# Patient Record
Sex: MALE | Race: WHITE | NOT HISPANIC OR LATINO | Employment: OTHER | URBAN - METROPOLITAN AREA
[De-identification: names, ages, dates, MRNs, and addresses within clinical notes are randomized per-mention and may not be internally consistent; named-entity substitution may affect disease eponyms.]

---

## 2018-05-15 ENCOUNTER — OFFICE VISIT (OUTPATIENT)
Dept: ORTHOPEDIC SURGERY | Facility: CLINIC | Age: 58
End: 2018-05-15

## 2018-05-15 VITALS
HEIGHT: 72 IN | WEIGHT: 210.54 LBS | HEART RATE: 79 BPM | SYSTOLIC BLOOD PRESSURE: 140 MMHG | BODY MASS INDEX: 28.52 KG/M2 | DIASTOLIC BLOOD PRESSURE: 87 MMHG

## 2018-05-15 DIAGNOSIS — M17.12 PRIMARY OSTEOARTHRITIS OF LEFT KNEE: Primary | ICD-10-CM

## 2018-05-15 DIAGNOSIS — M25.562 LEFT KNEE PAIN, UNSPECIFIED CHRONICITY: ICD-10-CM

## 2018-05-15 PROCEDURE — 99214 OFFICE O/P EST MOD 30 MIN: CPT | Performed by: ORTHOPAEDIC SURGERY

## 2018-05-15 PROCEDURE — 20610 DRAIN/INJ JOINT/BURSA W/O US: CPT | Performed by: ORTHOPAEDIC SURGERY

## 2018-05-15 RX ORDER — ROSUVASTATIN CALCIUM 10 MG/1
10 TABLET, COATED ORAL NIGHTLY
COMMUNITY
Start: 2018-04-30

## 2018-05-15 RX ORDER — METOPROLOL SUCCINATE 50 MG/1
50 TABLET, EXTENDED RELEASE ORAL DAILY
COMMUNITY
Start: 2018-03-30

## 2018-05-15 RX ORDER — CYCLOBENZAPRINE HCL 5 MG
TABLET ORAL
COMMUNITY
Start: 2018-04-30 | End: 2022-06-16

## 2018-05-15 RX ORDER — LIDOCAINE HYDROCHLORIDE 10 MG/ML
3 INJECTION, SOLUTION INFILTRATION; PERINEURAL
Status: COMPLETED | OUTPATIENT
Start: 2018-05-15 | End: 2018-05-15

## 2018-05-15 RX ORDER — TRIAMCINOLONE ACETONIDE 40 MG/ML
40 INJECTION, SUSPENSION INTRA-ARTICULAR; INTRAMUSCULAR
Status: COMPLETED | OUTPATIENT
Start: 2018-05-15 | End: 2018-05-15

## 2018-05-15 RX ORDER — HYDROXYCHLOROQUINE SULFATE 200 MG/1
200 TABLET, FILM COATED ORAL 2 TIMES DAILY
Refills: 5 | COMMUNITY
Start: 2018-02-22 | End: 2022-06-16

## 2018-05-15 RX ADMIN — TRIAMCINOLONE ACETONIDE 40 MG: 40 INJECTION, SUSPENSION INTRA-ARTICULAR; INTRAMUSCULAR at 08:54

## 2018-05-15 RX ADMIN — LIDOCAINE HYDROCHLORIDE 3 ML: 10 INJECTION, SOLUTION INFILTRATION; PERINEURAL at 08:54

## 2018-05-15 NOTE — PROGRESS NOTES
Procedure   Large Joint Arthrocentesis  Date/Time: 5/15/2018 8:54 AM  Consent given by: patient  Site marked: site marked  Timeout: Immediately prior to procedure a time out was called to verify the correct patient, procedure, equipment, support staff and site/side marked as required   Supporting Documentation  Indications: pain   Procedure Details  Location: knee - L knee  Preparation: Patient was prepped and draped in the usual sterile fashion  Needle size: 25 G  Approach: anterolateral  Medications administered: 3 mL lidocaine 1 %; 40 mg triamcinolone acetonide 40 MG/ML  Patient tolerance: patient tolerated the procedure well with no immediate complications

## 2018-05-15 NOTE — PROGRESS NOTES
[facility] [place] [facility]opa[facility] Surgery Clinic Note    Subjective     Pain of the Left Knee ((L) Knee Arthroscopy with Partial Medial Meniscectomy/Excision of Medial [place] 11/29/16. Progressively gotten worse last 4mos. 9/10 pain. Tried ice, elevation, and OTC Aleve. Sitting & walking for long periods causes pain and throbbing. )      DARI Mcadams is a 58 y.o. male. Patient is well-known to me.  Last seen in 2017.  Patient has not done well after arthroscopic knee surgery and a course of viscosupplementation.  He is an avid golfer and travels a lot for work.  He is having difficulty doing both of those things.  He is used to knee braces without a lot of improvement.  He has difficulty both at rest and with activity.  He has pain on the medial side of the knee and in the back of the knee.  He is seeing a dermatologist for a skin condition that has yet to see rheumatology.  Taken four and a half months to try to get into a rheumatologist in the past.     History reviewed. No pertinent past medical history.   Past Surgical History:   Procedure Laterality Date   • ELBOW ARTHROSCOPY     • KNEE SURGERY  11/21/2016    Arthroscopy       Family History   Problem Relation Age of Onset   • Stroke Mother    • Hypertension Mother    • Heart attack Mother    • Cancer Father    • Heart attack Father      Social History     Social History   • Marital status:      Spouse name: N/A   • Number of children: N/A   • Years of education: N/A     Occupational History   • Not on file.     Social History Main Topics   • Smoking status: Former Smoker     Packs/day: 1.00     Quit date: 5/15/2013   • Smokeless tobacco: Never Used   • Alcohol use No   • Drug use: No   • Sexual activity: Defer     Other Topics Concern   • Not on file     Social History Narrative   • No narrative on file      No current outpatient prescriptions on file prior to visit.     No current facility-administered medications on file prior to visit.       Allergies  "  Allergen Reactions   • Acetaminophen Other (See Comments)     Do not know    • Wellbutrin [Bupropion] Rash        The following portions of the patient's history were reviewed and updated as appropriate: allergies, current medications, past family history, past medical history, past social history, past surgical history and problem list.    Review of Systems   Constitutional: Positive for activity change.   HENT: Negative.    Eyes: Negative.    Respiratory: Negative.    Cardiovascular: Negative.    Gastrointestinal: Negative.    Endocrine: Negative.    Genitourinary: Negative.    Musculoskeletal: Positive for joint swelling.        Joint Pain    Skin: Positive for rash.   Allergic/Immunologic: Positive for immunocompromised state.   Neurological: Negative.    Hematological: Negative.    Psychiatric/Behavioral: Negative.         Objective      Physical Exam  /87   Pulse 79   Ht 183 cm (72.05\")   Wt 95.5 kg (210 lb 8.6 oz)   BMI 28.52 kg/m²     Body mass index is 28.52 kg/m².    General  Mental Status - alert  General Appearance - cooperative, well groomed, not in acute distress  Orientation - Oriented X3  Build & Nutrition - well developed and well nourished  Posture - normal posture  Gait - normal gait     Integumentary  Global Assessment  Examination of related systems reveals - no lymphadenopathy  General Characteristics  Overall examination of the patient's skin reveals - no rashes, no evidence of scars, no suspicious lesions and no bruises.  Color - normal coloration of skin.    Ortho Exam  Peripheral Vascular:    Upper Extremity:   Inspection:  Left--no cyanotic nail beds Right--no cyanotic nail beds   Bilateral:  Pink nail beds with brisk capillary refill   Palpation:  Bilateral radial pulse normal    Musculoskeletal:  Global Assessment:  Overall assessment of Lower Extremity Muscle Strength and Tone:  Left quadriceps--5/5   Left hamstrings--5/5       Left tibialis anterior--5/5  Left " gastroc-soleus--5/5  Left EHL --5/5    Lower Extremity:  Knee/Patella:  No digital clubbing or cyanosis.    Examination of left knee reveals:  Normal deep tendon reflexes, coordination, strength, tone, sensation.  No known fractures or deformities.    Inspection and Palpation:  Left knee:  Tenderness:  Over the medial joint line and moderate severity  Effusion:  1+  Crepitus:  Positive  Pulses:  2+  Ecchymosis:  None  Warmth:  None     ROM:  Right:  Extension: 5    Flexion: 120   Left:  Extension: 5     Flexion:120    Instability:    Left:  Lachman Test:  Negative, Varus stress test negative, Valgus stress test negative    Deformities/Malalignments/Discrepancies:    Left:  Genu Varum   Right:  No deformity    Functional Testing:  Judith's test:  Negative  Patella grind test:  Positive  Q-angle:  normal    Imaging/Studies  Imaging Results (last 24 hours)     Procedure Component Value Units Date/Time    XR Knee 3+ View With [place] Left [[id number]] Resulted:  05/15/18 0841     Updated:  05/15/18 0843    Narrative:       Knee X-Ray    Indication: Pain    Study:  Upright AP, Lateral, and Sunrise views of Left knee(s)    Comparison: None    Findings:    Patient appears to have moderate to severe degenerative changes in the   medial compartment.  There are moderate degenerative changes in the   lateral compartment.  There are moderate changes in the patellofemoral   compartment.  Patient has overall varus alignment.      Impression: Moderate to severe medial compartment and moderate lateral   patellofemoral compartment osteoarthritis of the left knee.              Assessment:  1. Primary osteoarthritis of left knee    2. Left knee pain, unspecified chronicity        Plan:  1. Continue over-the-counter medication as needed for discomfort  2. Kenalog injection will be given into the left knee today.  3. Rheumatology referral will be made today  4. Follow-up in 4-6 weeks.  We have brought up the possibility of [employer]  going forward.  Patient's knee is clearly going to need arthroplasty in the future.  We've shared that with him today.      Medical Decision Making  Management Options : over-the-counter medicine and prescription/IM medicine  Data/Risk: radiology tests and independent visualization of imaging, lab tests, or EMG/NCV    Delmer Hi MD  05/15/18  8:55 AM

## 2022-06-16 ENCOUNTER — OFFICE VISIT (OUTPATIENT)
Dept: ORTHOPEDIC SURGERY | Facility: CLINIC | Age: 62
End: 2022-06-16

## 2022-06-16 VITALS
HEIGHT: 72 IN | SYSTOLIC BLOOD PRESSURE: 122 MMHG | WEIGHT: 202 LBS | DIASTOLIC BLOOD PRESSURE: 74 MMHG | BODY MASS INDEX: 27.36 KG/M2

## 2022-06-16 DIAGNOSIS — S89.91XA INJURY OF RIGHT KNEE, INITIAL ENCOUNTER: ICD-10-CM

## 2022-06-16 DIAGNOSIS — G89.29 CHRONIC PAIN OF RIGHT KNEE: Primary | ICD-10-CM

## 2022-06-16 DIAGNOSIS — M17.11 PRIMARY OSTEOARTHRITIS OF RIGHT KNEE: ICD-10-CM

## 2022-06-16 DIAGNOSIS — M25.561 CHRONIC PAIN OF RIGHT KNEE: Primary | ICD-10-CM

## 2022-06-16 PROCEDURE — 99204 OFFICE O/P NEW MOD 45 MIN: CPT | Performed by: ORTHOPAEDIC SURGERY

## 2022-06-16 PROCEDURE — 20610 DRAIN/INJ JOINT/BURSA W/O US: CPT | Performed by: ORTHOPAEDIC SURGERY

## 2022-06-16 RX ORDER — CYCLOBENZAPRINE HCL 10 MG
10 TABLET ORAL AS NEEDED
COMMUNITY
Start: 2022-05-26

## 2022-06-16 RX ORDER — DICLOFENAC SODIUM 75 MG/1
75 TABLET, DELAYED RELEASE ORAL AS NEEDED
COMMUNITY
Start: 2022-06-09 | End: 2022-12-07 | Stop reason: HOSPADM

## 2022-06-16 RX ORDER — LEVOTHYROXINE, LIOTHYRONINE 19; 4.5 UG/1; UG/1
30 TABLET ORAL DAILY
COMMUNITY
Start: 2022-05-26

## 2022-06-16 RX ORDER — TRAMADOL HYDROCHLORIDE 50 MG/1
50 TABLET ORAL EVERY 6 HOURS PRN
COMMUNITY
Start: 2022-05-26 | End: 2022-12-07 | Stop reason: HOSPADM

## 2022-06-16 RX ORDER — MONTELUKAST SODIUM 10 MG/1
10 TABLET ORAL NIGHTLY
COMMUNITY
Start: 2022-05-26

## 2022-06-16 RX ORDER — TRIAMCINOLONE ACETONIDE 40 MG/ML
40 INJECTION, SUSPENSION INTRA-ARTICULAR; INTRAMUSCULAR
Status: COMPLETED | OUTPATIENT
Start: 2022-06-16 | End: 2022-06-16

## 2022-06-16 RX ORDER — LIDOCAINE HYDROCHLORIDE 10 MG/ML
3 INJECTION, SOLUTION EPIDURAL; INFILTRATION; INTRACAUDAL; PERINEURAL
Status: COMPLETED | OUTPATIENT
Start: 2022-06-16 | End: 2022-06-16

## 2022-06-16 RX ADMIN — TRIAMCINOLONE ACETONIDE 40 MG: 40 INJECTION, SUSPENSION INTRA-ARTICULAR; INTRAMUSCULAR at 11:41

## 2022-06-16 RX ADMIN — LIDOCAINE HYDROCHLORIDE 3 ML: 10 INJECTION, SOLUTION EPIDURAL; INFILTRATION; INTRACAUDAL; PERINEURAL at 11:41

## 2022-06-16 NOTE — PROGRESS NOTES
Procedure   Large Joint Arthrocentesis: R knee  Date/Time: 6/16/2022 11:41 AM  Consent given by: patient  Site marked: site marked  Timeout: Immediately prior to procedure a time out was called to verify the correct patient, procedure, equipment, support staff and site/side marked as required   Supporting Documentation  Indications: pain   Procedure Details  Location: knee - R knee  Preparation: Patient was prepped and draped in the usual sterile fashion  Needle size: 25 G  Approach: anterolateral  Medications administered: 3 mL lidocaine PF 1% 1 %; 40 mg triamcinolone acetonide 40 MG/ML  Patient tolerance: patient tolerated the procedure well with no immediate complications

## 2022-06-16 NOTE — PROGRESS NOTES
[facility] [place] [facility] Surgery Clinic Note        Subjective     Pain of the Right Knee      HPI    Rojas Mcadams is a 62 y.o. male who presents with new problem of: right knee pain.  Onset: golfing injury . The issue has been ongoing for 6 day(s). Pain is a 8/10 on the pain scale. Pain is described as dull, aching, burning, throbbing, stabbing and shooting. Associated symptoms include pain, swelling, grinding, stiffness and giving way/buckling. The pain is worse with walking, climbing stairs and any movement of the joint; ice and pain medication and/or NSAID improve the pain. Previous treatments have included: bracing and NSAIDS.    I have reviewed the following portions of the patient's history:History of Present Illness and review of systems.         Patient is seeing me today for new problem and injury.  Patient was playing in the [employer] golf tournament in [place] when he had a shot out of the bunker.  He went to get himself out of the bunker and his right knee gave way on the sand causing him to go into a significant valgus moment.  He was unable to drive home.  This occurred 4 days ago.  He is here for further evaluation and treatment.      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   • Rotator cuff syndrome 15 years      Past Surgical History:   Procedure Laterality Date   • ELBOW ARTHROSCOPY     • ELBOW PROCEDURE  05/01/2007   • KNEE SURGERY  11/21/2016    Arthroscopy       Family History   Problem Relation Age of Onset   • Stroke Mother    • Hypertension Mother    • Heart attack Mother    • Osteoporosis Mother    • Cancer Father    • Heart attack Father    • Clotting disorder Father    • Diabetes Brother      Social History     Socioeconomic History   • Marital status:    Tobacco Use   • Smoking status: Former Smoker     Packs/day: 1.00     Years: 15.00     Pack years: 15.00     Types: Cigarettes     Start date: 1/1/1978      "Quit date: 5/15/2013     Years since quittin.0   • Smokeless tobacco: Never Used   Substance and Sexual Activity   • Alcohol use: No   • Drug use: No   • Sexual activity: Yes     Partners: Female     Birth control/protection: None      Current Outpatient Medications on File Prior to Visit   Medication Sig Dispense Refill   • cyclobenzaprine (FLEXERIL) 10 MG tablet Take 5 mg by mouth As Needed.     • diclofenac (VOLTAREN) 75 MG EC tablet      • metoprolol succinate XL (TOPROL-XL) 50 MG 24 hr tablet      • montelukast (SINGULAIR) 10 MG tablet      • NP Thyroid 30 MG tablet      • rosuvastatin (CRESTOR) 10 MG tablet      • traMADol (ULTRAM) 50 MG tablet      • [DISCONTINUED] cyclobenzaprine (FLEXERIL) 5 MG tablet      • [DISCONTINUED] hydroxychloroquine (PLAQUENIL) 200 MG tablet Take 200 mg by mouth 2 (Two) Times a Day.  5     No current facility-administered medications on file prior to visit.      Allergies   Allergen Reactions   • Acetaminophen Other (See Comments)     Do not know    • Wellbutrin [Bupropion] Rash          Review of Systems   Constitutional: Negative.    HENT: Negative.    Eyes: Negative.    Respiratory: Negative.    Cardiovascular: Negative.    Gastrointestinal: Negative.    Endocrine: Negative.    Genitourinary: Negative.    Musculoskeletal: Positive for arthralgias.   Skin: Negative.    Allergic/Immunologic: Negative.    Neurological: Negative.    Hematological: Negative.    Psychiatric/Behavioral: Negative.         I reviewed the patient's chief complaint, history of present illness, review of systems, past medical history, surgical history, family history, social history, medications and allergy list.        Objective      Physical Exam  /74   Ht 183 cm (72.05\")   Wt 91.6 kg (202 lb)   BMI 27.36 kg/m²     Body mass index is 27.36 kg/m².    General  Mental Status - alert  General Appearance - cooperative, well groomed, not in acute distress  Orientation - Oriented X3  Build & " Nutrition - well developed and well nourished  Posture - normal posture  Gait - normal gait       Ortho Exam      Musculoskeletal:  Global Assessment:  Overall assessment of Lower Extremity Muscle Strength and Tone:    Right quadriceps--5/5  Right hamstrings--5/5  Right tibialis anterior--5/5  Right gastroc soleus--5/5  Right EHL--5/5    Lower Extremity:    Right knee:  Tenderness:  Over medial joint line  Effusion:  1+  Crepitus:  none  Pulses:  2+  Ecchymosis:  None  Warmth:  None     ROM:  Right:  Extension:0    Flexion:130    Instability:      Right:  Lachman Test:  Negative  Varus stress test negative  Valgus stress test negative   Anterior Drawer Test:  Negative  Posterior Drawer Test:  Negative    Functional Testing:  Right:  Judith's test:  Positive  Patella grind test:  Negative  Q-angle:  Normal  Apprehension Sign:  Negative      Imaging/Studies  Imaging Results (Last 24 Hours)     Procedure Component Value Units Date/Time    XR Knee 4+ View Right [[id number]] Resulted: 06/16/22 1112     Updated: 06/16/22 1113    Narrative:      Knee X-Ray    Indication: Pain    Study:  Upright AP, Skiers, Lateral, and Sunrise views of Right knee(s)    Comparison: None    Findings:    Patient appears to have moderate degenerative changes in the medial   compartment.    There are mild degenerative changes in the lateral compartment.    There are moderate changes in the patellofemoral compartment.    Patient has overall varus alignment.    Kellgren-Mati rdGrdrrdarddrderd:rd rd3rd Impression:   Moderate medial compartment and patellofemoral compartment degnerative   changes of the knee               Assessment    Assessment:  1. Chronic pain of right knee    2. Primary osteoarthritis of right knee    3. Injury of right knee, initial encounter        Plan:  1. Continue over-the-counter medication as needed for discomfort  2. Right knee injury in the face of chronic right knee pain and moderate medial and patellofemoral compartment  arthritis--patient likely has an MCL sprain.  I told him that this can take anywhere from 2 to 6 weeks to resolve with time.  Hinged neoprene brace will be given to him.  Furthermore for the likelihood of an exacerbation of his underlying arthritis, corticosteroid injection will be given today.  Follow-up in 4 weeks and we will see how he is doing overall.  If he is not a lot better, an MRI may be considered.    Procedure Note:    I discussed with the patient the potential benefits of performing a therapeutic injections as well as potential risks including but not limited to infection, swelling, pain, bleeding, bruising, nerve/vessel damage, skin color changes, transient elevation in blood glucose levels, and fat atrophy. After informed consent and after the areas were prepped with chlorhexadine soap, ethyl chloride was used to numb the skin. Via the superior lateral approach, 3mL of 1% lidocaine followed by 40mg of Kenalog were each injected into the right knee joint. The patient tolerated the procedure well. There were no complications. A sterile dressing was placed over the injection sites.          Delmer Hi MD  06/16/22  12:53 EDT      Dictated Utilizing Dragon Dictation.

## 2022-10-13 ENCOUNTER — OFFICE VISIT (OUTPATIENT)
Dept: ORTHOPEDIC SURGERY | Facility: CLINIC | Age: 62
End: 2022-10-13

## 2022-10-13 ENCOUNTER — PREP FOR SURGERY (OUTPATIENT)
Dept: OTHER | Facility: HOSPITAL | Age: 62
End: 2022-10-13

## 2022-10-13 VITALS
HEIGHT: 72 IN | BODY MASS INDEX: 26.74 KG/M2 | SYSTOLIC BLOOD PRESSURE: 132 MMHG | DIASTOLIC BLOOD PRESSURE: 78 MMHG | WEIGHT: 197.4 LBS

## 2022-10-13 DIAGNOSIS — M17.12 PRIMARY OSTEOARTHRITIS OF LEFT KNEE: Primary | ICD-10-CM

## 2022-10-13 DIAGNOSIS — M17.12 PRIMARY OSTEOARTHRITIS OF LEFT KNEE: ICD-10-CM

## 2022-10-13 DIAGNOSIS — M25.561 CHRONIC PAIN OF RIGHT KNEE: Primary | ICD-10-CM

## 2022-10-13 DIAGNOSIS — G89.29 CHRONIC PAIN OF RIGHT KNEE: Primary | ICD-10-CM

## 2022-10-13 DIAGNOSIS — S89.91XD INJURY OF RIGHT KNEE, SUBSEQUENT ENCOUNTER: ICD-10-CM

## 2022-10-13 DIAGNOSIS — M17.11 PRIMARY OSTEOARTHRITIS OF RIGHT KNEE: ICD-10-CM

## 2022-10-13 DIAGNOSIS — M25.562 LEFT KNEE PAIN, UNSPECIFIED CHRONICITY: ICD-10-CM

## 2022-10-13 PROBLEM — M17.9 OA (OSTEOARTHRITIS) OF KNEE: Status: ACTIVE | Noted: 2022-10-13

## 2022-10-13 PROCEDURE — 99214 OFFICE O/P EST MOD 30 MIN: CPT | Performed by: ORTHOPAEDIC SURGERY

## 2022-10-13 RX ORDER — CEFAZOLIN SODIUM 2 G/100ML
2 INJECTION, SOLUTION INTRAVENOUS ONCE
Status: CANCELLED | OUTPATIENT
Start: 2022-10-13 | End: 2022-10-13

## 2022-10-13 RX ORDER — FLUTICASONE PROPIONATE 0.05 %
1 CREAM (GRAM) TOPICAL DAILY
COMMUNITY
Start: 2022-09-19

## 2022-10-13 RX ORDER — TRANEXAMIC ACID 10 MG/ML
1000 INJECTION, SOLUTION INTRAVENOUS ONCE
Status: CANCELLED | OUTPATIENT
Start: 2022-10-13 | End: 2022-10-13

## 2022-10-13 RX ORDER — CHLORHEXIDINE GLUCONATE 4 G/100ML
SOLUTION TOPICAL DAILY
Qty: 236 ML | Refills: 0 | Status: SHIPPED | OUTPATIENT
Start: 2022-10-13 | End: 2022-12-22

## 2022-10-13 RX ORDER — ACETAMINOPHEN 500 MG
1000 TABLET ORAL ONCE
Status: CANCELLED | OUTPATIENT
Start: 2022-10-13 | End: 2022-10-13

## 2022-10-13 RX ORDER — TESTOSTERONE CYPIONATE 200 MG/ML
200 INJECTION, SOLUTION INTRAMUSCULAR
COMMUNITY
Start: 2022-10-10

## 2022-10-13 RX ORDER — KETOCONAZOLE 20 MG/G
1 CREAM TOPICAL AS NEEDED
COMMUNITY
Start: 2022-09-19

## 2022-10-13 RX ORDER — OXYCODONE HCL 10 MG/1
10 TABLET, FILM COATED, EXTENDED RELEASE ORAL ONCE
Status: CANCELLED | OUTPATIENT
Start: 2022-10-13 | End: 2022-10-13

## 2022-10-13 RX ORDER — OMEPRAZOLE 40 MG/1
40 CAPSULE, DELAYED RELEASE ORAL ONCE AS NEEDED
COMMUNITY
Start: 2022-08-17

## 2022-10-13 NOTE — PROGRESS NOTES
"    [facility] Note        Subjective     CC: Pain of the Left Knee and Follow-up (4 month f/u; Primary osteoarthritis of right knee (cortisone injection 6/16/22))      HPI    Rojas Mcadams is a 62 y.o. male who presents with new problem of: left knee pain.  Onset: atraumatic and gradual in nature. The issue has been ongoing for 1 month(s). Pain is a 8/10 on the pain scale. Pain is described as sharp. Associated symptoms include pain, swelling, popping, grinding, stiffness and giving way/buckling. The pain is worse with walking, standing, climbing stairs, sleeping, leisure and rising from seated position; ice and elevating the extremity improve the pain. Previous treatments have included: bracing and NSAIDS.    I have reviewed the following portions of the patient's history:History of Present Illness and review of systems.      Patient is here today for new problem day regarding his left knee.  At his last visit, he felt like he strained his MCL and exacerbated underlying right knee arthritis.  We injected him and as a result he is got much better.  His left knee now has become more problematic.  He has had a couple of surgeries on the left knee including what sounds like something for PVNS.  He also had a knee scope by me done years ago.  He says the left knee will lock occasionally when he goes from flexion to extension and internal rotation.  He has significant disability with his knee.  He is interested in pursuing definitive management.      ROS:    Constiutional:Pt denies fever, chills, nausea, or vomiting.  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    Patient is well " nourished and well developed.        Ortho Exam      Musculoskeletal:  Global Assessment:  Overall assessment of Lower Extremity Muscle Strength and Tone:  Left quadriceps--5/5   Left hamstrings--5/5       Left tibialis anterior--5/5  Left gastroc-soleus--5/5  Left EHL --5/5    Lower Extremity:    Inspection and Palpation:  Left knee:  Tenderness:  Over the medial joint line and moderate severity  Effusion:  1+  Crepitus:  Positive  Pulses:  2+  Ecchymosis:  None  Warmth:  None     ROM:  Left:  Extension: 5     Flexion:120    Instability:    Left:    Lachman Test:  Negative   Varus stress test negative  Valgus stress test negative    Deformities/Malalignments/Discrepancies:    Left:  Genu Varum     Functional Testing:  Judith's test:  Negative  Patella grind test:  Positive  Q-angle:  normal      Right knee: No effusion today.    Imaging/Labs/EMG Reviewed:  Imaging Results (Last 24 Hours)     Procedure Component Value Units Date/Time    XR Knee 4+ View Left [[id number]] Resulted: 10/13/22 1001     Updated: 10/13/22 1002    Narrative:      Knee X-Ray    Indication: Pain    Study:  Upright AP, Skiers, Lateral, and Sunrise views of Left knee(s)    Comparison: Left knee 5/15/2018    Findings:    Patient appears to have severe hypertrophic degenerative changes in the   medial compartment.    There are moderate degenerative changes in the lateral compartment.    There are severe changes in the patellofemoral compartment.    Patient has overall varus alignment.    Kellgren-Mati thGthrthathdtheth:th th4th Impression:   Severe medial compartment and patellofemoral compartment degnerative   changes of the knee               Assessment    Assessment:  1. Chronic pain of right knee    2. Primary osteoarthritis of right knee    3. Injury of right knee, subsequent encounter    4. Left knee pain, unspecified chronicity    5. Primary osteoarthritis of left knee        Plan:  1. Recommend over the counter anti-inflammatories for pain and/or  swelling  2. Right knee arthritis--improved after injection.  Observe for now.  3. Left knee arthritis--end-stage medial and patellofemoral.  Possible loose body noted in suprapatellar pouch.  The risk, benefits, potential hazards, typical recovery rehab as well as reasonable alternatives to outpatient left total knee arthroplasty were discussed with him.  Given his age, we will likely press-fit the component.  His father does have history of DVT so we will use Eliquis for 6 weeks after surgery for DVT prophylaxis.  I will see him back preoperatively.  He thinks he wants to do something in early December.  We will write a prescription for him for outpatient physical therapy to be done the day after surgery.  He will do this at [facility].  He will go to [facility] today for scheduling.      Delmer Hi MD  10/13/22  10:17 EDT      Dictated Utilizing Dragon Dictation.

## 2022-12-01 ENCOUNTER — PRE-ADMISSION TESTING (OUTPATIENT)
Dept: PREADMISSION TESTING | Facility: HOSPITAL | Age: 62
End: 2022-12-01

## 2022-12-01 ENCOUNTER — OFFICE VISIT (OUTPATIENT)
Dept: ORTHOPEDIC SURGERY | Facility: CLINIC | Age: 62
End: 2022-12-01

## 2022-12-01 VITALS — HEIGHT: 73 IN | BODY MASS INDEX: 26.72 KG/M2 | WEIGHT: 201.61 LBS

## 2022-12-01 VITALS
WEIGHT: 201 LBS | DIASTOLIC BLOOD PRESSURE: 70 MMHG | BODY MASS INDEX: 26.64 KG/M2 | SYSTOLIC BLOOD PRESSURE: 122 MMHG | HEIGHT: 73 IN

## 2022-12-01 DIAGNOSIS — M17.12 PRIMARY OSTEOARTHRITIS OF LEFT KNEE: ICD-10-CM

## 2022-12-01 DIAGNOSIS — G89.29 CHRONIC PAIN OF RIGHT KNEE: ICD-10-CM

## 2022-12-01 DIAGNOSIS — M25.561 CHRONIC PAIN OF RIGHT KNEE: ICD-10-CM

## 2022-12-01 DIAGNOSIS — M17.12 PRIMARY OSTEOARTHRITIS OF LEFT KNEE: Primary | ICD-10-CM

## 2022-12-01 DIAGNOSIS — M17.11 PRIMARY OSTEOARTHRITIS OF RIGHT KNEE: ICD-10-CM

## 2022-12-01 LAB
ANION GAP SERPL CALCULATED.3IONS-SCNC: 9 MMOL/L (ref 5–15)
BASOPHILS # BLD AUTO: 0.06 10*3/MM3 (ref 0–0.2)
BASOPHILS NFR BLD AUTO: 0.9 % (ref 0–1.5)
BUN SERPL-MCNC: 16 MG/DL (ref 8–23)
BUN/CREAT SERPL: 13 (ref 7–25)
CALCIUM SPEC-SCNC: 9.9 MG/DL (ref 8.6–10.5)
CHLORIDE SERPL-SCNC: 98 MMOL/L (ref 98–107)
CO2 SERPL-SCNC: 32 MMOL/L (ref 22–29)
CREAT SERPL-MCNC: 1.23 MG/DL (ref 0.76–1.27)
CRP SERPL-MCNC: <0.3 MG/DL (ref 0–0.5)
DEPRECATED RDW RBC AUTO: 43.7 FL (ref 37–54)
EGFRCR SERPLBLD CKD-EPI 2021: 67.2 ML/MIN/1.73
EOSINOPHIL # BLD AUTO: 0.3 10*3/MM3 (ref 0–0.4)
EOSINOPHIL NFR BLD AUTO: 4.6 % (ref 0.3–6.2)
ERYTHROCYTE [DISTWIDTH] IN BLOOD BY AUTOMATED COUNT: 12.7 % (ref 12.3–15.4)
ERYTHROCYTE [SEDIMENTATION RATE] IN BLOOD: 11 MM/HR (ref 0–20)
GLUCOSE SERPL-MCNC: 101 MG/DL (ref 65–99)
HBA1C MFR BLD: 5.8 % (ref 4.8–5.6)
HCT VFR BLD AUTO: 48.2 % (ref 37.5–51)
HGB BLD-MCNC: 16.1 G/DL (ref 13–17.7)
IMM GRANULOCYTES # BLD AUTO: 0.02 10*3/MM3 (ref 0–0.05)
IMM GRANULOCYTES NFR BLD AUTO: 0.3 % (ref 0–0.5)
LYMPHOCYTES # BLD AUTO: 1.74 10*3/MM3 (ref 0.7–3.1)
LYMPHOCYTES NFR BLD AUTO: 26.7 % (ref 19.6–45.3)
MCH RBC QN AUTO: 31.6 PG (ref 26.6–33)
MCHC RBC AUTO-ENTMCNC: 33.4 G/DL (ref 31.5–35.7)
MCV RBC AUTO: 94.5 FL (ref 79–97)
MONOCYTES # BLD AUTO: 0.61 10*3/MM3 (ref 0.1–0.9)
MONOCYTES NFR BLD AUTO: 9.4 % (ref 5–12)
NEUTROPHILS NFR BLD AUTO: 3.78 10*3/MM3 (ref 1.7–7)
NEUTROPHILS NFR BLD AUTO: 58.1 % (ref 42.7–76)
NRBC BLD AUTO-RTO: 0 /100 WBC (ref 0–0.2)
PLATELET # BLD AUTO: 237 10*3/MM3 (ref 140–450)
PMV BLD AUTO: 9.5 FL (ref 6–12)
POTASSIUM SERPL-SCNC: 4.8 MMOL/L (ref 3.5–5.2)
QT INTERVAL: 386 MS
QTC INTERVAL: 391 MS
RBC # BLD AUTO: 5.1 10*6/MM3 (ref 4.14–5.8)
SODIUM SERPL-SCNC: 139 MMOL/L (ref 136–145)
WBC NRBC COR # BLD: 6.51 10*3/MM3 (ref 3.4–10.8)

## 2022-12-01 PROCEDURE — 85652 RBC SED RATE AUTOMATED: CPT

## 2022-12-01 PROCEDURE — S0260 H&P FOR SURGERY: HCPCS | Performed by: ORTHOPAEDIC SURGERY

## 2022-12-01 PROCEDURE — 20610 DRAIN/INJ JOINT/BURSA W/O US: CPT | Performed by: ORTHOPAEDIC SURGERY

## 2022-12-01 PROCEDURE — 36415 COLL VENOUS BLD VENIPUNCTURE: CPT

## 2022-12-01 PROCEDURE — 86140 C-REACTIVE PROTEIN: CPT

## 2022-12-01 PROCEDURE — 93010 ELECTROCARDIOGRAM REPORT: CPT | Performed by: STUDENT IN AN ORGANIZED HEALTH CARE EDUCATION/TRAINING PROGRAM

## 2022-12-01 PROCEDURE — 93005 ELECTROCARDIOGRAM TRACING: CPT

## 2022-12-01 PROCEDURE — 83036 HEMOGLOBIN GLYCOSYLATED A1C: CPT

## 2022-12-01 PROCEDURE — 80048 BASIC METABOLIC PNL TOTAL CA: CPT

## 2022-12-01 PROCEDURE — 85025 COMPLETE CBC W/AUTO DIFF WBC: CPT

## 2022-12-01 RX ORDER — LIDOCAINE HYDROCHLORIDE 10 MG/ML
3 INJECTION, SOLUTION EPIDURAL; INFILTRATION; INTRACAUDAL; PERINEURAL
Status: COMPLETED | OUTPATIENT
Start: 2022-12-01 | End: 2022-12-01

## 2022-12-01 RX ORDER — TRIAMCINOLONE ACETONIDE 40 MG/ML
40 INJECTION, SUSPENSION INTRA-ARTICULAR; INTRAMUSCULAR
Status: COMPLETED | OUTPATIENT
Start: 2022-12-01 | End: 2022-12-01

## 2022-12-01 RX ADMIN — LIDOCAINE HYDROCHLORIDE 3 ML: 10 INJECTION, SOLUTION EPIDURAL; INFILTRATION; INTRACAUDAL; PERINEURAL at 14:54

## 2022-12-01 RX ADMIN — TRIAMCINOLONE ACETONIDE 40 MG: 40 INJECTION, SUSPENSION INTRA-ARTICULAR; INTRAMUSCULAR at 14:54

## 2022-12-01 NOTE — PROGRESS NOTES
Procedure   Large Joint Arthrocentesis: R knee  Date/Time: 12/1/2022 2:54 PM  Consent given by: patient  Site marked: site marked  Timeout: Immediately prior to procedure a time out was called to verify the correct patient, procedure, equipment, support staff and site/side marked as required   Supporting Documentation  Indications: pain   Procedure Details  Location: knee - R knee  Preparation: Patient was prepped and draped in the usual sterile fashion  Needle size: 23 G  Approach: anterolateral  Medications administered: 3 mL lidocaine PF 1% 1 %; 40 mg triamcinolone acetonide 40 MG/ML  Patient tolerance: patient tolerated the procedure well with no immediate complications

## 2022-12-01 NOTE — PROGRESS NOTES
"    [facility] Note        Subjective     CC: Follow-up (7 week follow up -- Primary osteoarthritis of left knee )      HPI    Rojas Mcadams is a 62 y.o. male.  Patient is here today for his preoperative appointment for left total knee arthroplasty scheduled for 2022.  He tells me that he is having quite a bit of right knee pain and feels like it may affect his ability to perform therapy after surgery.    Overall, patient's symptoms are as above.    ROS:    Constiutional:Pt denies fever, chills, nausea, or vomiting.  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   • Rotator cuff syndrome 15 years   • Sleep apnea     cpap compliant   • Spinal headache    • Wears eyeglasses      Social History     Socioeconomic History   • Marital status:    Tobacco Use   • Smoking status: Former     Packs/day: 1.00     Years: 15.00     Pack years: 15.00     Types: Cigarettes     Start date: 1978     Quit date: 5/15/2013     Years since quittin.5   • Smokeless tobacco: Never   Vaping Use   • Vaping Use: Never used   Substance and Sexual Activity   • Alcohol use: Not Currently     Comment: rare social   • Drug use: No   • Sexual activity: Yes     Partners: Female     Birth control/protection: None          Physical Exam  /70   Ht 185.4 cm (72.99\")   Wt 91.2 kg (201 lb)   BMI 26.52 kg/m²     Body mass index is 26.52 kg/m².    Patient is well nourished and well developed.        Ortho Exam      Musculoskeletal:  Global Assessment:  Overall assessment of Lower Extremity Muscle Strength and Tone:  Left quadriceps--5/5   Left hamstrings--5/5       Left tibialis anterior--5/5  Left " gastroc-soleus--5/5  Left EHL --5/5    Lower Extremity:    Inspection and Palpation:  Left knee:  Tenderness:  Over the medial joint line and moderate severity  Effusion:  1+  Crepitus:  Positive  Pulses:  2+  Ecchymosis:  None  Warmth:  None     ROM:  Left:  Extension: 5     Flexion:120    Instability:    Left:    Lachman Test:  Negative   Varus stress test negative  Valgus stress test negative    Deformities/Malalignments/Discrepancies:    Left:  Genu Varum     Functional Testing:  Judith's test:  Negative  Patella grind test:  Positive  Q-angle:  normal      Imaging/Labs/EMG Reviewed:  Imaging Results (Last 24 Hours)     ** No results found for the last 24 hours. **            Assessment    Assessment:  1. Primary osteoarthritis of left knee    2. Chronic pain of right knee    3. Primary osteoarthritis of right knee        Plan:  1. Recommend over the counter anti-inflammatories for pain and/or swelling  2. Right knee arthritis--steroid injection will be given today.  3. Left knee arthritis--we again reviewed the risk, benefits, potential hazards, typical recovery and rehab as well as reasonable alternatives to left total knee arthroplasty.  Patient will be on Eliquis after surgery for 6 weeks.  He will do the surgery as an outpatient.  We will use a press-fit implant given his age if possible.  He will start physical therapy at [facility] and already has appointment.      Procedure Note:    I discussed with the patient the potential benefits of performing a therapeutic injections as well as potential risks including but not limited to infection, swelling, pain, bleeding, bruising, nerve/vessel damage, skin color changes, transient elevation in blood glucose levels, and fat atrophy. After informed consent and after the areas were prepped with chlorhexadine soap, ethyl chloride was used to numb the skin. Via the superior lateral approach, 3mL of 1% lidocaine followed by 40mg of Kenalog were each injected into  the right knee joint. The patient tolerated the procedure well. There were no complications. A sterile dressing was placed over the injection sites.        Delmer Hi MD  12/01/22  16:01 EST      Dictated Utilizing Dragon Dictation.

## 2022-12-01 NOTE — DISCHARGE INSTRUCTIONS
Patient to apply Chlorhexadine wipes  to surgical area (as instructed) the night before procedure and the AM of procedure. Wipes provided.     Patient instructed to bring CPAP mask and tubing to the hospital for overnight stay.  Explained that it is not necessary to bring their CPAP machine to the hospital instead a CPAP machine will be provided for use by the hospital. If patient knows their CPAP settings, those settings will be implemented.  If not, the CPAP machine will be utilized on the auto setting using their mask and tubing.    Patient verbalized understanding.   \  In[employer] (by [employer]) pain pump patient informational handout given to patient.  Instructed patient to watch [employer] Patient Education Video regarding Peripheral Nerve Catheter that will be in place for upcoming surgery unless contraindicated. The video can be accessed using QR code noted on handout.  Patient agreed to watch video.  Stressed to patient to call [employer] Nursing Hotline  if patient has any questions or concerns after discharge.      Patient instructed to drink 20 ounces of Gatorade and it needs to be completed 1 hour (for Main OR patients) or 2 hours (scheduled  section & [facility] patients) before given arrival time for procedure (NO RED Gatorade)    Patient verbalized understanding.     Prescription for Bactroban (if prescribed) and Chlorhexidine shower called into patient's pharmacy or [facility] pharmacy by patient's surgeon.  Reinforced with patient to  the prescription from applicable pharmacy if they haven't already.  Verbal and written instructions given regarding proper use of the Bactroban (if prescribed) and Chlorhexidine to patient and/or famlily during PAT visit. Patient/family also instructed to complete checklist and return it to Pre-op on the day of surgery.  Patient and/or family verbalized understanding.     Discussed with patient options for receiving total joint replacement education and  assessed patient's ability and preference. Joint Replacement Guide given to patient during PAT visit since not received a copy within the last year. Encouraged patient/family to read guide thoroughly and notify PAT staff with any questions or concerns. Handout provided directing patient to links to watch online videos related to joint replacement surgery on the [facility] website. The handout gives detailed instructions for joining an online joint replacement class through Zoom or phone conference offered on Thursdays. Patient agreed to participate by watching videos online. Patient verbalized understanding of instructions and to complete the online learning tool survey. Encouraged to share information with family and/or . An overview of the joint replacement education was provided during the visit including general perioperative instructions that are routine for all surgical patients (PAT PASS, wipes, directions to pre-op, etc.).

## 2022-12-01 NOTE — PAT
Patient denies any current skin issues.     An arrival time for procedure was not provided during PAT visit. If patient had any questions or concerns about their arrival time, they were instructed to contact their surgeon/physician.  Additionally, if the patient referred to an arrival time that was acquired from their my chart account, patient was encouraged to verify that time with their surgeon/physician. Arrival times are NOT provided in Pre Admission Testing Department.    Patient expressed concern about the CHG shower and wipes causing a rash given his history of GA. Antibacterial wipes ( [employer]) provided with written instructions to ONLY use those if he developed a rash with the CHG preparations . Patient verbalized understanding.     Prescription for Bactroban (if prescribed) and Chlorhexidine shower called into patient's pharmacy or [facility] pharmacy by patient's surgeon.  Reinforced with patient to  the prescription from applicable pharmacy if they haven't already.  Verbal and written instructions given regarding proper use of the Bactroban (if prescribed) and Chlorhexidine to patient and/or famlily during PAT visit. Patient/family also instructed to complete checklist and return it to Pre-op on the day of surgery.  Patient and/or family verbalized understanding.      In[employer]BLOCK (by [employer]) pain pump patient informational handout given to patient.  Instructed patient to watch [employer] Patient Education Video regarding Peripheral Nerve Catheter that will be in place for upcoming surgery unless contraindicated. The video can be accessed using QR code noted on handout.  Patient agreed to watch video.  Stressed to patient to call [employer] Nursing Hotline 24/7 if patient has any questions or concerns after discharge.     Patient to apply Chlorhexadine wipes  to surgical area (as instructed) the night before procedure and the AM of procedure. Wipes provided.    Patient instructed to drink 20 ounces of  Gatorade and it needs to be completed 1 hour (for Main OR patients) or 2 hours (scheduled  section & [facility] patients) before given arrival time for procedure (NO RED Gatorade)    Patient verbalized understanding.    Patient instructed to bring CPAP mask and tubing to the hospital for overnight stay.  Explained that it is not necessary to bring their CPAP machine to the hospital instead a CPAP machine will be provided for use by the hospital. If patient knows their CPAP settings, those settings will be implemented.  If not, the CPAP machine will be utilized on the auto setting using their mask and tubing.    Patient verbalized understanding.Discussed with patient options for receiving total joint replacement education and assessed patient's ability and preference. Joint Replacement Guide given to patient during PAT visit since not received a copy within the last year. Encouraged patient/family to read guide thoroughly and notify PAT staff with any questions or concerns. Handout provided directing patient to links to watch online videos related to joint replacement surgery on the [facility] website. The handout gives detailed instructions for joining an online joint replacement class through Zoom or phone conference offered on . Patient agreed to participate by watching videos online. Patient verbalized understanding of instructions and to complete the online learning tool survey. Encouraged to share information with family and/or . An overview of the joint replacement education was provided during the visit including general perioperative instructions that are routine for all surgical patients (PAT PASS, wipes, directions to pre-op, etc.).

## 2022-12-06 ENCOUNTER — ANESTHESIA EVENT (OUTPATIENT)
Dept: PERIOP | Facility: HOSPITAL | Age: 62
End: 2022-12-06

## 2022-12-06 RX ORDER — FAMOTIDINE 10 MG/ML
20 INJECTION, SOLUTION INTRAVENOUS ONCE
Status: CANCELLED | OUTPATIENT
Start: 2022-12-06 | End: 2022-12-06

## 2022-12-07 ENCOUNTER — ANESTHESIA EVENT CONVERTED (OUTPATIENT)
Dept: ANESTHESIOLOGY | Facility: HOSPITAL | Age: 62
End: 2022-12-07

## 2022-12-07 ENCOUNTER — ANESTHESIA (OUTPATIENT)
Dept: PERIOP | Facility: HOSPITAL | Age: 62
End: 2022-12-07

## 2022-12-07 ENCOUNTER — APPOINTMENT (OUTPATIENT)
Dept: GENERAL RADIOLOGY | Facility: HOSPITAL | Age: 62
End: 2022-12-07

## 2022-12-07 ENCOUNTER — HOSPITAL ENCOUNTER (OUTPATIENT)
Facility: HOSPITAL | Age: 62
Discharge: HOME | End: 2022-12-07
Attending: ORTHOPAEDIC SURGERY | Admitting: ORTHOPAEDIC SURGERY

## 2022-12-07 VITALS
SYSTOLIC BLOOD PRESSURE: 145 MMHG | RESPIRATION RATE: 16 BRPM | HEART RATE: 74 BPM | DIASTOLIC BLOOD PRESSURE: 89 MMHG | OXYGEN SATURATION: 100 % | BODY MASS INDEX: 26.64 KG/M2 | TEMPERATURE: 97.4 F | WEIGHT: 201 LBS | HEIGHT: 73 IN

## 2022-12-07 DIAGNOSIS — Z96.652 STATUS POST TOTAL LEFT KNEE REPLACEMENT: Primary | ICD-10-CM

## 2022-12-07 DIAGNOSIS — M17.12 PRIMARY OSTEOARTHRITIS OF LEFT KNEE: ICD-10-CM

## 2022-12-07 LAB — GLUCOSE BLDC GLUCOMTR-MCNC: 113 MG/DL (ref 70–130)

## 2022-12-07 PROCEDURE — C1776 JOINT DEVICE (IMPLANTABLE): HCPCS | Performed by: ORTHOPAEDIC SURGERY

## 2022-12-07 PROCEDURE — 73560 X-RAY EXAM OF KNEE 1 OR 2: CPT

## 2022-12-07 PROCEDURE — 25010000002 KETOROLAC TROMETHAMINE PER 15 MG: Performed by: ORTHOPAEDIC SURGERY

## 2022-12-07 PROCEDURE — 25010000002 MORPHINE PER 10 MG: Performed by: ORTHOPAEDIC SURGERY

## 2022-12-07 PROCEDURE — 97110 THERAPEUTIC EXERCISES: CPT

## 2022-12-07 PROCEDURE — C1755 CATHETER, INTRASPINAL: HCPCS | Performed by: ORTHOPAEDIC SURGERY

## 2022-12-07 PROCEDURE — 25010000002 PROPOFOL 10 MG/ML EMULSION: Performed by: NURSE ANESTHETIST, CERTIFIED REGISTERED

## 2022-12-07 PROCEDURE — 25010000002 DEXAMETHASONE PER 1 MG: Performed by: NURSE ANESTHETIST, CERTIFIED REGISTERED

## 2022-12-07 PROCEDURE — 97161 PT EVAL LOW COMPLEX 20 MIN: CPT

## 2022-12-07 PROCEDURE — 25010000002 CEFAZOLIN IN DEXTROSE 2-4 GM/100ML-% SOLUTION: Performed by: ORTHOPAEDIC SURGERY

## 2022-12-07 PROCEDURE — 25010000002 MIDAZOLAM PER 1 MG: Performed by: ANESTHESIOLOGY

## 2022-12-07 PROCEDURE — 25010000002 ROPIVACAINE PER 1 MG: Performed by: ORTHOPAEDIC SURGERY

## 2022-12-07 PROCEDURE — 82962 GLUCOSE BLOOD TEST: CPT

## 2022-12-07 PROCEDURE — C1713 ANCHOR/SCREW BN/BN,TIS/BN: HCPCS | Performed by: ORTHOPAEDIC SURGERY

## 2022-12-07 PROCEDURE — 27447 TOTAL KNEE ARTHROPLASTY: CPT | Performed by: ORTHOPAEDIC SURGERY

## 2022-12-07 PROCEDURE — 27447 TOTAL KNEE ARTHROPLASTY: CPT | Performed by: PHYSICIAN ASSISTANT

## 2022-12-07 PROCEDURE — 25010000002 ONDANSETRON PER 1 MG: Performed by: NURSE ANESTHETIST, CERTIFIED REGISTERED

## 2022-12-07 PROCEDURE — 25010000002 ROPIVACAINE PER 1 MG: Performed by: NURSE ANESTHETIST, CERTIFIED REGISTERED

## 2022-12-07 DEVICE — COMP FEM/KN PERSONA CR POR COCR STD SZ8 LT: Type: IMPLANTABLE DEVICE | Site: KNEE | Status: FUNCTIONAL

## 2022-12-07 DEVICE — DEV CONTRL TISS STRATAFIX SYMM PDS PLUS VIL CT-1 45CM: Type: IMPLANTABLE DEVICE | Site: KNEE | Status: FUNCTIONAL

## 2022-12-07 DEVICE — PAT NXGN POR 10X32MM: Type: IMPLANTABLE DEVICE | Site: KNEE | Status: FUNCTIONAL

## 2022-12-07 DEVICE — CAP TOTL KN POROUS/HYBRID PRIMARY: Type: IMPLANTABLE DEVICE | Site: KNEE | Status: FUNCTIONAL

## 2022-12-07 DEVICE — ART/SRF KN PERSONA/VE PS GH 8TO11 11MM LT: Type: IMPLANTABLE DEVICE | Site: KNEE | Status: FUNCTIONAL

## 2022-12-07 DEVICE — STEM TIB/KN PERSONA TRABECULAR 2PEG SZG LT: Type: IMPLANTABLE DEVICE | Site: KNEE | Status: FUNCTIONAL

## 2022-12-07 RX ORDER — ROPIVACAINE HYDROCHLORIDE 2 MG/ML
INJECTION, SOLUTION EPIDURAL; INFILTRATION; PERINEURAL CONTINUOUS
Status: DISCONTINUED | OUTPATIENT
Start: 2022-12-07 | End: 2022-12-07 | Stop reason: HOSPADM

## 2022-12-07 RX ORDER — LIDOCAINE HYDROCHLORIDE 10 MG/ML
INJECTION, SOLUTION EPIDURAL; INFILTRATION; INTRACAUDAL; PERINEURAL AS NEEDED
Status: DISCONTINUED | OUTPATIENT
Start: 2022-12-07 | End: 2022-12-07 | Stop reason: SURG

## 2022-12-07 RX ORDER — TRANEXAMIC ACID 10 MG/ML
1000 INJECTION, SOLUTION INTRAVENOUS ONCE
Status: COMPLETED | OUTPATIENT
Start: 2022-12-07 | End: 2022-12-07

## 2022-12-07 RX ORDER — ACETAMINOPHEN 325 MG/1
650 TABLET ORAL EVERY 6 HOURS PRN
Qty: 60 TABLET | Refills: 1 | Status: SHIPPED | OUTPATIENT
Start: 2022-12-07

## 2022-12-07 RX ORDER — DEXAMETHASONE SODIUM PHOSPHATE 10 MG/ML
INJECTION INTRAMUSCULAR; INTRAVENOUS AS NEEDED
Status: DISCONTINUED | OUTPATIENT
Start: 2022-12-07 | End: 2022-12-07 | Stop reason: SURG

## 2022-12-07 RX ORDER — ACETAMINOPHEN 500 MG
1000 TABLET ORAL ONCE
Status: COMPLETED | OUTPATIENT
Start: 2022-12-07 | End: 2022-12-07

## 2022-12-07 RX ORDER — CEFAZOLIN SODIUM 2 G/100ML
2 INJECTION, SOLUTION INTRAVENOUS ONCE
Status: COMPLETED | OUTPATIENT
Start: 2022-12-07 | End: 2022-12-07

## 2022-12-07 RX ORDER — DOCUSATE SODIUM 100 MG/1
100 CAPSULE, LIQUID FILLED ORAL 2 TIMES DAILY
Qty: 60 CAPSULE | Refills: 1 | Status: SHIPPED | OUTPATIENT
Start: 2022-12-07

## 2022-12-07 RX ORDER — MIDAZOLAM HYDROCHLORIDE 1 MG/ML
1 INJECTION INTRAMUSCULAR; INTRAVENOUS
Status: DISCONTINUED | OUTPATIENT
Start: 2022-12-07 | End: 2022-12-07 | Stop reason: HOSPADM

## 2022-12-07 RX ORDER — PROPOFOL 10 MG/ML
VIAL (ML) INTRAVENOUS AS NEEDED
Status: DISCONTINUED | OUTPATIENT
Start: 2022-12-07 | End: 2022-12-07 | Stop reason: SURG

## 2022-12-07 RX ORDER — CEFAZOLIN SODIUM 2 G/100ML
2 INJECTION, SOLUTION INTRAVENOUS EVERY 8 HOURS
Status: DISCONTINUED | OUTPATIENT
Start: 2022-12-07 | End: 2022-12-07 | Stop reason: HOSPADM

## 2022-12-07 RX ORDER — FAMOTIDINE 20 MG/1
20 TABLET, FILM COATED ORAL ONCE
Status: COMPLETED | OUTPATIENT
Start: 2022-12-07 | End: 2022-12-07

## 2022-12-07 RX ORDER — FENTANYL CITRATE 50 UG/ML
50 INJECTION, SOLUTION INTRAMUSCULAR; INTRAVENOUS
Status: DISCONTINUED | OUTPATIENT
Start: 2022-12-07 | End: 2022-12-07 | Stop reason: HOSPADM

## 2022-12-07 RX ORDER — ONDANSETRON 4 MG/1
4 TABLET, FILM COATED ORAL EVERY 8 HOURS PRN
Qty: 20 TABLET | Refills: 1 | Status: SHIPPED | OUTPATIENT
Start: 2022-12-07

## 2022-12-07 RX ORDER — IBUPROFEN 600 MG/1
600 TABLET ORAL EVERY 6 HOURS PRN
Status: DISCONTINUED | OUTPATIENT
Start: 2022-12-07 | End: 2022-12-07 | Stop reason: HOSPADM

## 2022-12-07 RX ORDER — OXYCODONE HCL 10 MG/1
10 TABLET, FILM COATED, EXTENDED RELEASE ORAL ONCE
Status: COMPLETED | OUTPATIENT
Start: 2022-12-07 | End: 2022-12-07

## 2022-12-07 RX ORDER — MELOXICAM 7.5 MG/1
7.5 TABLET ORAL DAILY
Qty: 30 TABLET | Refills: 1 | Status: SHIPPED | OUTPATIENT
Start: 2022-12-07

## 2022-12-07 RX ORDER — TRANEXAMIC ACID 10 MG/ML
1000 INJECTION, SOLUTION INTRAVENOUS ONCE
Status: DISCONTINUED | OUTPATIENT
Start: 2022-12-07 | End: 2022-12-07 | Stop reason: HOSPADM

## 2022-12-07 RX ORDER — MAGNESIUM HYDROXIDE 1200 MG/15ML
LIQUID ORAL AS NEEDED
Status: DISCONTINUED | OUTPATIENT
Start: 2022-12-07 | End: 2022-12-07 | Stop reason: HOSPADM

## 2022-12-07 RX ORDER — BUPIVACAINE HYDROCHLORIDE 2.5 MG/ML
INJECTION, SOLUTION EPIDURAL; INFILTRATION; INTRACAUDAL
Status: DISCONTINUED | OUTPATIENT
Start: 2022-12-07 | End: 2022-12-07 | Stop reason: SURG

## 2022-12-07 RX ORDER — HYDROMORPHONE HYDROCHLORIDE 1 MG/ML
0.5 INJECTION, SOLUTION INTRAMUSCULAR; INTRAVENOUS; SUBCUTANEOUS
Status: DISCONTINUED | OUTPATIENT
Start: 2022-12-07 | End: 2022-12-07 | Stop reason: HOSPADM

## 2022-12-07 RX ORDER — OXYCODONE HYDROCHLORIDE AND ACETAMINOPHEN 5; 325 MG/1; MG/1
1 TABLET ORAL ONCE AS NEEDED
Status: DISCONTINUED | OUTPATIENT
Start: 2022-12-07 | End: 2022-12-07 | Stop reason: HOSPADM

## 2022-12-07 RX ORDER — SODIUM CHLORIDE 0.9 % (FLUSH) 0.9 %
10 SYRINGE (ML) INJECTION EVERY 12 HOURS SCHEDULED
Status: DISCONTINUED | OUTPATIENT
Start: 2022-12-07 | End: 2022-12-07 | Stop reason: HOSPADM

## 2022-12-07 RX ORDER — OXYCODONE HYDROCHLORIDE 5 MG/1
5 TABLET ORAL EVERY 4 HOURS PRN
Qty: 30 TABLET | Refills: 0 | Status: SHIPPED | OUTPATIENT
Start: 2022-12-07 | End: 2022-12-22

## 2022-12-07 RX ORDER — ONDANSETRON 4 MG/1
4 TABLET, FILM COATED ORAL ONCE AS NEEDED
Status: DISCONTINUED | OUTPATIENT
Start: 2022-12-07 | End: 2022-12-07 | Stop reason: HOSPADM

## 2022-12-07 RX ORDER — SODIUM CHLORIDE 0.9 % (FLUSH) 0.9 %
10 SYRINGE (ML) INJECTION AS NEEDED
Status: DISCONTINUED | OUTPATIENT
Start: 2022-12-07 | End: 2022-12-07 | Stop reason: HOSPADM

## 2022-12-07 RX ORDER — BUPIVACAINE HYDROCHLORIDE 5 MG/ML
INJECTION, SOLUTION PERINEURAL
Status: COMPLETED | OUTPATIENT
Start: 2022-12-07 | End: 2022-12-07

## 2022-12-07 RX ORDER — SODIUM CHLORIDE, SODIUM LACTATE, POTASSIUM CHLORIDE, CALCIUM CHLORIDE 600; 310; 30; 20 MG/100ML; MG/100ML; MG/100ML; MG/100ML
9 INJECTION, SOLUTION INTRAVENOUS CONTINUOUS
Status: DISCONTINUED | OUTPATIENT
Start: 2022-12-07 | End: 2022-12-07 | Stop reason: HOSPADM

## 2022-12-07 RX ORDER — LIDOCAINE HYDROCHLORIDE 10 MG/ML
0.5 INJECTION, SOLUTION EPIDURAL; INFILTRATION; INTRACAUDAL; PERINEURAL ONCE AS NEEDED
Status: COMPLETED | OUTPATIENT
Start: 2022-12-07 | End: 2022-12-07

## 2022-12-07 RX ORDER — ONDANSETRON 2 MG/ML
INJECTION INTRAMUSCULAR; INTRAVENOUS AS NEEDED
Status: DISCONTINUED | OUTPATIENT
Start: 2022-12-07 | End: 2022-12-07 | Stop reason: SURG

## 2022-12-07 RX ORDER — SODIUM CHLORIDE 9 MG/ML
40 INJECTION, SOLUTION INTRAVENOUS AS NEEDED
Status: DISCONTINUED | OUTPATIENT
Start: 2022-12-07 | End: 2022-12-07 | Stop reason: HOSPADM

## 2022-12-07 RX ADMIN — MIDAZOLAM 2 MG: 1 INJECTION INTRAMUSCULAR; INTRAVENOUS at 06:49

## 2022-12-07 RX ADMIN — BUPIVACAINE HYDROCHLORIDE 20 ML: 2.5 INJECTION, SOLUTION EPIDURAL; INFILTRATION; INTRACAUDAL at 10:27

## 2022-12-07 RX ADMIN — PROPOFOL 50 MG: 10 INJECTION, EMULSION INTRAVENOUS at 07:36

## 2022-12-07 RX ADMIN — CEFAZOLIN SODIUM 2 G: 2 INJECTION, SOLUTION INTRAVENOUS at 07:46

## 2022-12-07 RX ADMIN — LIDOCAINE HYDROCHLORIDE 50 MG: 10 INJECTION, SOLUTION EPIDURAL; INFILTRATION; INTRACAUDAL; PERINEURAL at 07:36

## 2022-12-07 RX ADMIN — BUPIVACAINE HYDROCHLORIDE 2.5 ML: 5 INJECTION, SOLUTION PERINEURAL at 07:50

## 2022-12-07 RX ADMIN — TRANEXAMIC ACID 1000 MG: 10 INJECTION, SOLUTION INTRAVENOUS at 09:24

## 2022-12-07 RX ADMIN — OXYCODONE HYDROCHLORIDE 10 MG: 10 TABLET, FILM COATED, EXTENDED RELEASE ORAL at 06:28

## 2022-12-07 RX ADMIN — ACETAMINOPHEN 1000 MG: 500 TABLET ORAL at 06:38

## 2022-12-07 RX ADMIN — ONDANSETRON 4 MG: 2 INJECTION INTRAMUSCULAR; INTRAVENOUS at 09:27

## 2022-12-07 RX ADMIN — PROPOFOL 125 MCG/KG/MIN: 10 INJECTION, EMULSION INTRAVENOUS at 07:47

## 2022-12-07 RX ADMIN — SODIUM CHLORIDE, POTASSIUM CHLORIDE, SODIUM LACTATE AND CALCIUM CHLORIDE 9 ML/HR: 600; 310; 30; 20 INJECTION, SOLUTION INTRAVENOUS at 06:29

## 2022-12-07 RX ADMIN — Medication 1000 MG: at 10:37

## 2022-12-07 RX ADMIN — MUPIROCIN 1 APPLICATION: 20 OINTMENT TOPICAL at 06:28

## 2022-12-07 RX ADMIN — FAMOTIDINE 20 MG: 20 TABLET ORAL at 06:28

## 2022-12-07 RX ADMIN — DEXAMETHASONE SODIUM PHOSPHATE 8 MG: 10 INJECTION INTRAMUSCULAR; INTRAVENOUS at 07:47

## 2022-12-07 RX ADMIN — CEFAZOLIN SODIUM 2 G: 2 INJECTION, SOLUTION INTRAVENOUS at 10:47

## 2022-12-07 RX ADMIN — TRANEXAMIC ACID 1000 MG: 10 INJECTION, SOLUTION INTRAVENOUS at 07:44

## 2022-12-07 RX ADMIN — LIDOCAINE HYDROCHLORIDE 0.5 ML: 10 INJECTION, SOLUTION EPIDURAL; INFILTRATION; INTRACAUDAL; PERINEURAL at 06:28

## 2022-12-07 NOTE — ANESTHESIA PROCEDURE NOTES
Peripheral Block      Patient reassessed immediately prior to procedure    Reason for block: at surgeon's request and post-op pain management  Performed by  CRNA/CAA: Josephine Palumbo CRNA  Assisted by: Sintia Arredondo RN  Preanesthetic Checklist  Completed: patient identified, IV checked, site marked, risks and benefits discussed, surgical consent, monitors and equipment checked, pre-op evaluation and timeout performed  Prep:  Pt Position: supine  Sterile barriers:cap, gloves, mask, sterile barriers and washed/disinfected hands  Prep: ChloraPrep  Patient monitoring: blood pressure monitoring, continuous pulse oximetry and EKG  Procedure  Performed under: spinal  Guidance:ultrasound guided    ULTRASOUND INTERPRETATION.  Using ultrasound guidance a 20 G gauge needle was placed in close proximity to the nerve, at which point, under ultrasound guidance anesthetic was injected in the area of the nerve and spread of the anesthesia was seen on ultrasound in close proximity thereto.  There were no abnormalities seen on ultrasound; a digital image was taken; and the patient tolerated the procedure with no complications. Images:still images obtained, printed/placed on chart    Laterality:left  Block Type:adductor canal block  Injection Technique:catheter  Needle Type:Tuohy and echogenic  Needle Gauge:18 G  Resistance on Injection: none  Catheter Size:20 G (20g)  Cath Depth at skin: 10 cm    Medications Used: bupivacaine PF (MARCAINE) 0.25 % injection - Injection   20 mL - 12/7/2022 10:27:00 AM      Post Assessment  Injection Assessment: negative aspiration for heme, incremental injection and no paresthesia on injection  Patient Tolerance:comfortable throughout block  Complications:no  Additional Notes  CATHETER   A high-frequency linear transducer, with sterile cover, was placed on the anterior mid-thigh (between the anterior superior iliac spine and patella). The transducer was then moved medially to identify the  "Sartorius muscle (Hannah), Vastus Medialis muscle (VMM), Superficial Femoral Artery (SFA) and Vein. The transducer was then moved cephalad or caudad to position the SFA in the middle of the Hannah. The insertion site was prepped and draped in sterile fashion. Skin and cutaneous tissue was infiltrated with 2-5 ml of 1% Lidocaine. Using ultrasound-guidance, an 18-gauge [employer]iplex Ultra 360 Touhy needle was advanced in plane from lateral to medial. Preservative-free normal saline was utilized for hydro-dissection of tissue, advancement of Touhy, and to confirm needle placement below the fascial plane of the Hannah where the Nerve to the VMM is located. Local anesthetic (LA) 5 ml deposited here. The Touhy needle continues its path lateral to the SFA at the level of the Saphenous Nerve. The remainder of the LA was deposited at the 10-11 o'clock position of the SFA. This injection created a space between the Hannah and the SFA. Aspiration every 5 ml to prevent intravascular injection. Injection was completed with negative aspiration of blood and negative intravascular injection. Injection pressures were normal with minimal resistance. A 20-gauge [employer]iplex Echo catheter was placed through the needle and advance out the tip of the Touhy 3-5 cm anterior to the SFA. The Touhy needle was then removed, and final catheter position verified at the 12 o'clock position to the SFA. The catheter was secured in the usual fashion with skin glue, benzoin, steri-strips, CHG tegaderm and label noting \"Nerve Block Catheter\". Jerk tape applied at yellow connector and catheter connection.           "

## 2022-12-07 NOTE — H&P
Pre-Op H&P  Rojas Mcadams  [id number]  1960    Chief complaint: left knee pain     HPI:    Patient is a 62 y.o.male who presents with a history of bilateral knee pain due to osteoarthritis with the left>right. He has been unable to obtain adequate relief with conservative treatment options and presents to the operating room today for surgical management with a left total knee replacement.      Review of Systems:  General ROS: negative for chills, fever or skin lesions;  No changes since last office visit.  Neg for recent sick exposure  Cardiovascular ROS: no chest pain or dyspnea on exertion  Respiratory ROS: no cough, shortness of breath, or wheezing    Allergies:   Allergies   Allergen Reactions   • Chantix [Varenicline] Hives     In combination with wellbutrin    • Wellbutrin [Bupropion] Rash       Home Meds:    No current facility-administered medications on file prior to encounter.     Current Outpatient Medications on File Prior to Encounter   Medication Sig Dispense Refill   • chlorhexidine (HIBICLENS) 4 % external liquid Apply  topically to the appropriate area as directed Daily. Shower w/ solution for 5 days before surgery. [facility] to [facility] to be filled. 236 mL 0   • cyclobenzaprine (FLEXERIL) 10 MG tablet Take 10 mg by mouth As Needed.     • diclofenac (VOLTAREN) 75 MG EC tablet Take 75 mg by mouth As Needed.     • fluticasone (CUTIVATE) 0.05 % cream Apply 1 application topically to the appropriate area as directed Daily.     • metoprolol succinate XL (TOPROL-XL) 50 MG 24 hr tablet Take 50 mg by mouth Daily.     • montelukast (SINGULAIR) 10 MG tablet Take 10 mg by mouth Every Night.     • mupirocin (BACTROBAN) 2 % ointment 1 application into the nostril(s) as directed by provider 2 (Two) Times a Day. Apply to each nostril twice daily for 5 days before surgery. 22 g 0   • NP Thyroid 30 MG tablet Take 30 mg by mouth Daily.     • omeprazole (priLOSEC) 40 MG capsule Take 40 mg by mouth 1 (One) Time As Needed  "(gerd).     • rosuvastatin (CRESTOR) 10 MG tablet Take 10 mg by mouth Every Night.     • traMADol (ULTRAM) 50 MG tablet Take 50 mg by mouth Every 6 (Six) Hours As Needed.     • ketoconazole (NIZORAL) 2 % cream Apply 1 application topically to the appropriate area as directed As Needed.     • Testosterone Cypionate (DEPOTESTOTERONE CYPIONATE) 200 MG/ML injection Inject 200 mg into the appropriate muscle as directed by prescriber Every 14 (Fourteen) Days.         PMH:   Past Medical History:   Diagnosis Date   • Arthritis    • Disease of thyroid gland    • Frozen shoulder 20 years   • GA (granuloma annulare)    • GERD (gastroesophageal reflux disease)    • Hyperlipidemia    • Hypertension    • Knee swelling 20 years   • Periarthritis of shoulder 20 years   • Rotator cuff syndrome 15 years   • Sleep apnea     cpap compliant   • Spinal headache    • Wears eyeglasses      PSH:    Past Surgical History:   Procedure Laterality Date   • COLONOSCOPY     • ELBOW PROCEDURE Right 2007    golfers elbow   • KNEE SURGERY Left 2016    Arthroscopy  debridement       Immunization History:  Influenza: 2020  Pneumococcal: denies  Tetanus: >10 years     Social History:   Tobacco:   Social History     Tobacco Use   Smoking Status Former   • Packs/day: 1.00   • Years: 15.00   • Pack years: 15.00   • Types: Cigarettes   • Start date: 1978   • Quit date: 5/15/2013   • Years since quittin.5   Smokeless Tobacco Never      Alcohol:     Social History     Substance and Sexual Activity   Alcohol Use Not Currently    Comment: rare social       Vitals:           /91 (BP Location: Right arm, Patient Position: Sitting)   Pulse 68   Temp 97.2 °F (36.2 °C) (Temporal)   Resp 16   Ht 185.4 cm (73\")   Wt 91.2 kg (201 lb)   SpO2 98%   BMI 26.52 kg/m²     Physical Exam:  General Appearance:    Alert, cooperative, no distress, appears stated age   Head:    Normocephalic, without obvious abnormality, atraumatic   Lungs:     " Clear to auscultation bilaterally, respirations unlabored    Heart:   Regular rate and rhythm, S1 and S2 normal, no murmur, rub    or gallop    Abdomen:    Soft, nontender.  +bowel sounds   Breast Exam:    deferred   Genitalia:    deferred   Extremities:   Extremities normal, atraumatic, no cyanosis or edema   Skin:   Skin color, texture, turgor normal, no rashes or lesions   Neurologic:   Grossly intact   Results Review  LABS:  Lab Results   Component Value Date    WBC 6.51 12/01/2022    HGB 16.1 12/01/2022    HCT 48.2 12/01/2022    MCV 94.5 12/01/2022     12/01/2022    NEUTROABS 3.78 12/01/2022    GLUCOSE 101 (H) 12/01/2022    BUN 16 12/01/2022    CREATININE 1.23 12/01/2022     12/01/2022    K 4.8 12/01/2022    CL 98 12/01/2022    CO2 32.0 (H) 12/01/2022    CALCIUM 9.9 12/01/2022       RADIOLOGY:  No radiology results for the last 3 days     Cancer Staging (if applicable)  Cancer Patient: __ yes _x_no __unknown; If yes, clinical stage T:__ N:__M:__, stage group or __N/A    Impression: primary osteoarthritis of left knee     Plan: left total knee arthroplasty       Khadar Scott PA-C   12/07/22   7:02 AM EST

## 2022-12-07 NOTE — PLAN OF CARE
Goal Outcome Evaluation:  Plan of Care Reviewed With: patient, spouse        Progress: no change  Outcome Evaluation: PT eval complete. Pt performed bed mobility Mod I. pt performed STS and amb 400' with FWW and CGAx2. Pt navigated 1 steps with CGAx2. No LOB or knee buckling noted. L knee ROM 9-95. HEP and precautions reviewed with pt. Recommend d/c home with assist and OPPT when medically appropriate. Pt cleared to d/c today from PT stand point. Will need FWW prior to d/c.

## 2022-12-07 NOTE — ADDENDUM NOTE
Addendum  created 12/07/22 1102 by Josephine Palumbo, CRNA    Child order released for a procedure order, Clinical Note Signed, Intraprocedure Blocks edited, LDA created via procedure documentation, SmartForm saved

## 2022-12-07 NOTE — OP NOTE
Orthopaedics Operative Report    PREOPERATIVE DIAGNOSIS: Primary osteoarthritis left knee    POSTOPERATIVE DIAGNOSIS: Same    PROCEDURE PERFORMED: Left total knee arthroplasty, CPT 05564    SURGEON: Delmer Hi MD    ANESTHESIA:  Spinal    STAFF:  Circulator: Zaida Dan RN; Ruthie No RN; Karen Irving RN  Scrub Person: Reg Chacon  Vendor Representative: Jaswinder Wiley  Nursing Assistant: Arnulfo Perez  Assistant: Ada Sherman PA-C    TOURNIQUET TIME: 68 minutes    ESTIMATED BLOOD LOSS: 50 mL    COMPLICATIONS: None apparent.    RELEASES: None required after bone cuts made and osteophytes removed    APPROACH:  Medial parapatellar    TXA: IV    IMPLANTS:     Implant Name Type Inv. Item Serial No.  Lot No. LRB No. Used Action   DEV CONTRL TISS STRATAFIX SYMM PDS PLUS CALEB CT-1 45CM - [id number] Implant DEV CONTRL TISS STRATAFIX SYMM PDS PLUS CALEB CT-1 45CM  [employer] Left 1 Implanted   COMP FEM/KN PERSONA CR POR COCR STD SZ8 LT - [id number] Implant COMP FEM/KN PERSONA CR POR COCR STD SZ8 LT  [employer] [id number] Left 1 Implanted   STEM TIB PERSONA TRABECULAR 2PEG SZG LT - [id number] Implant STEM TIB PERSONA TRABECULAR 2PEG SZG LT  [employer] [id number] Left 1 Implanted   PAT NXGN POR 32A55WG - [id number] Implant PAT NXGN POR [id number]  [employer] [id number] Left 1 Implanted   ART/SRF KN PERSONA/VE PS GH 8TO11 11MM LT - [id number] Implant ART/SRF KN PERSONA/VE PS GH 8TO11 11MM LT  [employer] [id number] Left 1 Implanted       Rambo Persona TM CR femur size 8 standard  size TM G tibia  TM 32 patella button   Size 11 Vitamin E Medial Congruent highly crosslinked polyethylene articular surface    PREOPERATIVE ANTIBIOTICS: Kefzol    REFERRING PHYSICIAN: Juan Baumann MD    INDICATIONS: Failure of nonoperative treatment including injections, bracing, and activity modification.    DESCRIPTION OF PROCEDURE: After informed consent was obtained, the  patient was taken to the operating room. The patient was given a dose of IV antibiotics prior to incision.After the smooth induction of spinal anesthesia, the patient’s  left lower extremity was prepped and draped in the usual fashion for this type of procedure. We performed a timeout to verify site and the procedure to be performed.  We began with exsanguination of the left lower extremity using an Esmarch bandage and inflation of tourniquet to 300 mmHg. We made our standard midline incision and medial parapatellar approach. We took 20% of the quadriceps tendon medially and a sleeve of tissue around the patella for repair as well a sliver of patellar tendon. Dissected extra-ostially but subperiosteally on the medial side taking off the superficial and deep MCL from the proximal tibia. These were protected throughout the procedure. Visible medial meniscus was excised. The retropatellar fat pad was excised. The ACL and visible lateral meniscus was excised as well as the synovium from the anterior surface of the distal femur.  Osteophytes were removed from the distal femur and proximal tibia at this point.        We then everted the patella and this was resurfaced, restoring patellar height. The patellar height was  24 mm preoperatively and we cut the patella to 14 mm and sized the patella to a 32.  The lugholes were drilled and the component slightly medialized.       We then turned our attention to preparation of the femur. We placed our intramedullary distal femoral guide into place set on 5 degrees of valgus and due to preoperative flexion contracture, we took an extra  to mm of bone off of the distal femur. The distal femur was cut protecting medial and lateral structures.  We were vigilant about making sure the distal femoral cut was flat.  We then marked Bonner's line and sized our femur to be a size  8. We marked 3° of external rotation which correlated well with Bonner's line. We then secured this block  into place and made our anterior, posterior, and chamfer cuts.  Again, we were vigilant about the cut flatness to make sure that the bone was appropriate for press-fit.  It was noted that the bone was excellent quality overall.  The pins were removed and the bone cuts were completed and freshened up. We then excised our posterior cruciate ligament and exposed the proximal tibia. We took 10 mm of bone off the lateral side. We protected medial and lateral structures during our cut as well as the patellar tendon. We completed the cut. We sized the tibia to be a size  G ensuring that we had cortical contact on the medial lateral aspects of the tibial component. We then removed meniscus from the back of the knee. We used our flexion extension blocks to make sure our flexion and extension gaps were acceptable.  We checked alignment at this point as well.  We checked the appropriateness of the tibial bone for press-fit and it was felt to be appropriate and excellent overall.  We then placed the cutting guide back on to the pins to ensure that the cut was flat.  Once this was ensured, we then completed the preparation of the tibia, aiming the center of the baseplate at the medial third of the tibial tubercle.      We then completed the preparation of our femoral component.  We then trialed and were stable on the medial side at 0°, 30°, 60°, 90° and 120°. The lug holes were then drilled.  We then implanted these components into place starting with the tibial component first.  We controlled the implantation to make sure that we were parallel with the tibial cut throughout implantation.  Once we ensured that the component was fully seated, we moved our attention to the femoral component.  We used our femoral component handle and impactor and  impacted the femoral component without difficulty into place.  Once we ensured that again the femoral component was fully seated, we then moved our attention to the patella component and  this was clamped into place without difficulty.  We then trialed and a size  11 polyethylene spacer had good stability and full range of motion.  We then thoroughly irrigated the knee at that point and injected our posterior capsule with our local anesthetic consisting of 20 mL of normal saline, 20 mL of half percent lidocaine with epi, 20 mL of half percent bupivacaine, 30 mg of Toradol, and 8 mg of morphine.  We then deflated the tourniquet prior to placement of our final polyethylene spacer. Any bleeding seen in the back of the knee was controlled. The final spacer was then secured into place. We then used a final irrigation consisting of Irricept, diluted betadine and normal saline throughout the knee.  We then closed our subcutaneous layer using a 0 strata fix, running 2-0 Vicryl and interrupted 2-0 Vicryl. We closed our skin using a running 3-0 Monocryl. We placed an Exofin Fusion dressing system over the incision and took down the drapes. The patient was transferred back to their hospital bed and then taken to the recovery room in stable condition. All counts were correct postoperatively. I performed the case.     First assistant: Ada Sherman PA-C     The skilled assistance of the above noted first assistant was necessary during this complex surgical procedure.  The surgical assistant assisted with every aspect of the operation including, but not limited to, proper and safe positioning of the patient, obtaining adequate surgical exposure, manipulation of surgical instruments to make the proper bone cuts, cementing of the final implants, the continual process of hemostasis during the procedure itself in addition to surgical wound closure and removal of the patient from the operating table and returning the patient back to the [facility].  The assistance of the surgical assistant allowed me to perform the most sensitive and technical potions of this operation using 2 hands, thus enhancing  efficiency and patient safety.  This would not be possible without the help of a skilled assistant familiar with the procedure and capable of safely performing the aforementioned tasks.         POSTOPERATIVE PLAN:  1. Weight bearing as tolerated left lower extremity.  2. The patient will receive an indwelling low femoral nerve block in the recovery room.  3.  Patient will receive a dose of IV antibiotics prior to discharge home  4.  Eliquis 2.5 twice daily beginning tomorrow morning and continuing for 4 weeks for DVT prophylaxis.  5.  The patient will begin outpatient physical therapy in the next 24 to 48 hours  6.  Discharge home once the patient has met criteria  7.  Patient will be discharged home with a prescription for oxycodone, meloxicam, Tylenol, Colace, and Zofran in addition to their DVT prophylaxis    Delmer Hi M.D.*

## 2022-12-07 NOTE — ANESTHESIA PREPROCEDURE EVALUATION
Anesthesia Evaluation     Patient summary reviewed and Nursing notes reviewed   no history of anesthetic complications:  NPO Solid Status: > 8 hours  NPO Liquid Status: > 2 hours           Airway   Mallampati: II  TM distance: >3 FB  Neck ROM: full  No difficulty expected  Dental - normal exam     Pulmonary - normal exam    breath sounds clear to auscultation  (+) sleep apnea on CPAP,   Cardiovascular - normal exam    ECG reviewed  Rhythm: regular  Rate: normal    (+) hypertension,       Neuro/Psych- negative ROS  GI/Hepatic/Renal/Endo    (+)  GERD (Mild),  thyroid problem hyperthyroidism    Musculoskeletal     Abdominal    Substance History      OB/GYN          Other   arthritis,                      Anesthesia Plan    ASA 2     spinal     (Left adductor canal block/catheter with Infu-system pump for post-operative analgesia per request of Dr. Hi.  Time out performed to verify patient, surgeon, and surgical site.)  intravenous induction     Anesthetic plan, risks, benefits, and alternatives have been provided, discussed and informed consent has been obtained with: patient.    Plan discussed with CRNA.        CODE STATUS:

## 2022-12-07 NOTE — ANESTHESIA PROCEDURE NOTES
Spinal Block      Patient reassessed immediately prior to procedure    Patient location during procedure: OR  Indication:at surgeon's request  Performed By  Anesthesiologist: Al Allen MD  Preanesthetic Checklist  Completed: patient identified, IV checked, site marked, risks and benefits discussed, surgical consent, monitors and equipment checked, pre-op evaluation and timeout performed  Spinal Block Prep:  Patient Position:sitting  Sterile Tech:cap, gloves, sterile barriers and mask  Prep:Chloraprep  Patient Monitoring:blood pressure monitoring, continuous pulse oximetry and EKG    Spinal Block Procedure  Approach:midline  Guidance:landmark technique and palpation technique  Location:L4-L5  Needle Type:Sprotte  Needle Gauge:25 G  Placement of Spinal needle event:cerebrospinal fluid aspirated  Paresthesia: no  Fluid Appearance:clear  Medications: bupivacaine (MARCAINE) 0.5 % injection - Injection   2.5 mL - 12/7/2022 7:50:00 AM   Post Assessment  Patient Tolerance:patient tolerated the procedure well with no apparent complications  Complications no  Additional Notes  Procedure:  Pt assisted to sitting position, with legs in position of comfort over side of bed.  Pt. instructed in optimal spine presentation, the spine was prepped/ Draped and the skin at insertion site was anesthetized with 1% Lidocaine 2 ml.  The spinal needle was then advanced until CSF flow was obtained and LA was injected:

## 2022-12-07 NOTE — CASE MANAGEMENT/SOCIAL WORK
Continued Stay Note  [facility]     Patient Name: Rojas Mcadams  MRN: [id number]  Today's Date: 12/7/2022    Admit Date: 12/7/2022    Plan:  follow up   Discharge Plan     Row Name 12/07/22 1426       Plan    Plan  follow up    Plan Comments MSW met with pt. and his family at bedside. Pt. reports that he needs a walker after d/c. MSW delivered and pt. signed for a rolling walker. Pt. reports that he has an appointment with [facility] in [place], [place] 12/8/22 at 1:00pm. MSW verified with outpatient PT that everything was arranged. MSW is available.    Final Discharge Disposition Code 01 - home or self-care               Discharge Codes    No documentation.               Expected Discharge Date and Time     Expected Discharge Date Expected Discharge Time    Dec 7, 2022             SAMANTHA Combs

## 2022-12-07 NOTE — THERAPY EVALUATION
Patient Name: Rojas Mcadams  : 1960    MRN: [id number]                              Today's Date: 2022       Admit Date: 2022    Visit Dx:     ICD-10-CM ICD-9-CM   1. 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   • Rotator cuff syndrome 15 years   • Sleep apnea     cpap compliant   • Spinal headache    • Wears eyeglasses      Past Surgical History:   Procedure Laterality Date   • COLONOSCOPY     • ELBOW PROCEDURE Right 2007    golfers elbow   • KNEE SURGERY Left 2016    Arthroscopy  debridement      General Information     Row Name 22 1411          Physical Therapy Time and Intention    Document Type evaluation  -     Mode of Treatment physical therapy  -     Row Name 22 1411          General Information    Patient Profile Reviewed yes  -HP     Prior Level of Function min assist:;all household mobility;community mobility;gait;transfer;bed mobility;ADL's  -     Existing Precautions/Restrictions fall;other (see comments)  adductor canal nerve cath  -     Barriers to Rehab none identified  -     Row Name 22 1411          Living Environment    People in Home spouse  -HP     Row Name 22 1411          Home Main Entrance    Number of Stairs, Main Entrance one  -HP     Stair Railings, Main Entrance none  -HP     Row Name 22 1411          Stairs Within Home, Primary    Stairs, Within Home, Primary BR/BA on main level  -     Number of Stairs, Within Home, Primary twelve  -HP     Row Name 22 1411          Cognition    Orientation Status (Cognition) oriented x 3  -HP     Row Name  12/07/22 1411          Safety Issues, Functional Mobility    Safety Issues Affecting Function (Mobility) insight into deficits/self-awareness;awareness of need for assistance;safety precaution awareness  -HP     Impairments Affecting Function (Mobility) balance;endurance/activity tolerance;pain;strength;sensation/sensory awareness;range of motion (ROM)  -HP           User Key  (r) = Recorded By, (t) = Taken By, (c) = Cosigned By    Initials Name Provider Type     Namrata Longo PT Physical Therapist               Mobility     Row Name 12/07/22 1412          Bed Mobility    Bed Mobility supine-sit;sit-supine  -HP     Supine-Sit [place] (Bed Mobility) modified independence  -HP     Sit-Supine [place] (Bed Mobility) modified independence  -HP     Row Name 12/07/22 1412          Sit-Stand Transfer    Sit-Stand [place] (Transfers) contact guard;2 person assist  -HP     Assistive Device (Sit-Stand Transfers) walker, front-wheeled  -HP     Row Name 12/07/22 1412          Gait/Stairs (Locomotion)    [place] Level (Gait) contact guard;2 person assist  -HP     Assistive Device (Gait) walker, front-wheeled  -HP     Ambulated day of surgery or within 4 hours of PACU discharge yes  -HP     Distance in Feet (Gait) 400  -HP     Deviations/Abnormal Patterns (Gait) bilateral deviations;base of support, wide;weight shifting decreased;stride length decreased;gait speed decreased  -HP     Bilateral Gait Deviations forward flexed posture;heel strike decreased  -HP     [place] Level (Stairs) contact guard;2 person assist  -HP     Handrail Location (Stairs) both sides  -HP     Number of Steps (Stairs) 1  -HP     Ascending Technique (Stairs) step-to-step  -HP     Descending Technique (Stairs) step-to-step  -HP     Comment, (Gait/Stairs) Pt amb 400' with FWW and CGAx2. Step-through gait pattern with forward flexed posture and stiff knee gait. VC for increased foot clearance on RLE. Pt navigated 1 steps with  CGAx2. No LOB or knee buckling noted. Activity limited by fatigue.  -     Row Name 12/07/22 1412          Mobility    Extremity Weight-bearing Status left lower extremity  -     Left Lower Extremity (Weight-bearing Status) weight-bearing as tolerated (WBAT)  -           User Key  (r) = Recorded By, (t) = Taken By, (c) = Cosigned By    Initials Name Provider Type     Namrata Longo PT Physical Therapist               Obj/Interventions     Row Name 12/07/22 1419          Range of Motion Comprehensive    General Range of Motion lower extremity range of motion deficits identified  -     Comment, General Range of Motion L knee ROM 9-95  -     Row Name 12/07/22 1419          Strength Comprehensive (MMT)    General Manual Muscle Testing (MMT) Assessment lower extremity strength deficits identified  -     Comment, General Manual Muscle Testing (MMT) Assessment pt able to perform B active ankle DF and SLR  -     Row Name 12/07/22 1419          Motor Skills    Therapeutic Exercise knee;ankle  -[facility] Name 12/07/22 1419          Knee (Therapeutic Exercise)    Knee (Therapeutic Exercise) strengthening exercise;isometric exercises  -     Knee Isometrics (Therapeutic Exercise) left;quad sets;10 repetitions  -     Knee Strengthening (Therapeutic Exercise) left;SLR (straight leg raise);LAQ (long arc quad);heel slides;10 repetitions  -     Row Name 12/07/22 1419          Ankle (Therapeutic Exercise)    Ankle (Therapeutic Exercise) AROM (active range of motion)  -     Ankle AROM (Therapeutic Exercise) bilateral;dorsiflexion;plantarflexion;10 repetitions  -     Row Name 12/07/22 1419          Balance    Balance Assessment sitting static balance;sitting dynamic balance;sit to stand dynamic balance;standing static balance;standing dynamic balance  -     Static Sitting Balance standby assist  -     Dynamic Sitting Balance standby assist  -     Position, Sitting Balance sitting edge of bed  -      Static Standing Balance contact guard  -HP     Dynamic Standing Balance contact guard  -HP     Position/Device Used, Standing Balance supported;walker, rolling  -HP     Balance Interventions sitting;standing;sit to stand;occupation based/functional task  -     Row Name 12/07/22 1419          Sensory Assessment (Somatosensory)    Sensory Assessment (Somatosensory) bilateral LE  -HP     Bilateral LE Sensory Assessment general sensation;impaired  -HP           User Key  (r) = Recorded By, (t) = Taken By, (c) = Cosigned By    Initials Name Provider Type     Namrata Longo, PT Physical Therapist               Goals/Plan     Row Name 12/07/22 1422          Bed Mobility Goal 1 (PT)    Activity/Assistive Device (Bed Mobility Goal 1, PT) sit to supine/supine to sit  -HP     [place] Level/Cues Needed (Bed Mobility Goal 1, PT) modified independence  -HP     Time Frame (Bed Mobility Goal 1, PT) long term goal (LTG);3 days  -HP     Progress/Outcomes (Bed Mobility Goal 1, PT) goal ongoing  -     Row Name 12/07/22 1422          Transfer Goal 1 (PT)    Activity/Assistive Device (Transfer Goal 1, PT) sit-to-stand/stand-to-sit  -HP     [place] Level/Cues Needed (Transfer Goal 1, PT) modified independence  -HP     Time Frame (Transfer Goal 1, PT) long term goal (LTG);3 days  -HP     Progress/Outcome (Transfer Goal 1, PT) goal ongoing  -     Row Name 12/07/22 1422          Gait Training Goal 1 (PT)    Activity/Assistive Device (Gait Training Goal 1, PT) gait (walking locomotion)  -HP     [place] Level (Gait Training Goal 1, PT) modified independence  -HP     Distance (Gait Training Goal 1, PT) 500  -HP     Time Frame (Gait Training Goal 1, PT) long term goal (LTG);3 days  -HP     Progress/Outcome (Gait Training Goal 1, PT) goal ongoing  -     Row Name 12/07/22 1422          ROM Goal 1 (PT)    ROM Goal 1 (PT) L knee ROM 0-100  -HP     Time Frame (ROM Goal 1, PT) long-term goal (LTG);3 days  -HP      Progress/Outcome (ROM Goal 1, PT) goal ongoing  -     Row Name 12/07/22 1422          Stairs Goal 1 (PT)    Activity/Assistive Device (Stairs Goal 1, PT) ascending stairs;descending stairs  -     [place] Level/Cues Needed (Stairs Goal 1, PT) modified independence  -HP     Number of Stairs (Stairs Goal 1, PT) 1  -HP     Time Frame (Stairs Goal 1, PT) long term goal (LTG);3 days  -HP     Progress/Outcome (Stairs Goal 1, PT) goal ongoing  -HP     Row Name 12/07/22 1422          Therapy Assessment/Plan (PT)    Planned Therapy Interventions (PT) balance training;bed mobility training;gait training;home exercise program;patient/family education;transfer training;stretching;strengthening;stair training;ROM (range of motion)  -           User Key  (r) = Recorded By, (t) = Taken By, (c) = Cosigned By    Initials Name Provider Type     Namrata Longo, PT Physical Therapist               Clinical Impression     Row Name 12/07/22 1420          Pain    Pretreatment Pain Rating 0/10 - no pain  -     Posttreatment Pain Rating 0/10 - no pain  -     Row Name 12/07/22 1420          Plan of Care Review    Plan of Care Reviewed With patient;spouse  -     Progress no change  -     Outcome Evaluation PT eval complete. Pt performed bed mobility Mod I. pt performed STS and amb 400' with FWW and CGAx2. Pt navigated 1 steps with CGAx2. No LOB or knee buckling noted. L knee ROM 9-95. HEP and precautions reviewed with pt. Recommend d/c home with assist and OPPT when medically appropriate. Pt cleared to d/c today from PT stand point. Will need FWW prior to d/c.  -     Row Name 12/07/22 1420          Therapy Assessment/Plan (PT)    Rehab Potential (PT) good, to achieve stated therapy goals  -     Criteria for Skilled Interventions Met (PT) yes;meets criteria;skilled treatment is necessary  -     Therapy Frequency (PT) 2 times/day  -     Row Name 12/07/22 1420          Vital Signs    Pre Systolic BP Rehab --  VSS  -      Pre Patient Position Supine  -HP     Intra Patient Position Standing  -HP     Post Patient Position Sitting  -HP     Row Name 12/07/22 1420          Positioning and Restraints    Pre-Treatment Position in bed  -HP     Post Treatment Position bed  -HP     In Bed notified nsg;supine;fowlers;call light within reach;encouraged to call for assist;exit alarm on;with family/caregiver;side rails up x2  -HP           User Key  (r) = Recorded By, (t) = Taken By, (c) = Cosigned By    Initials Name Provider Type    Namrata Laurent PT Physical Therapist               Outcome Measures     Row Name 12/07/22 1423          How much help from another person do you currently need...    Turning from your back to your side while in flat bed without using bedrails? 4  -HP     Moving from lying on back to sitting on the side of a flat bed without bedrails? 4  -HP     Moving to and from a bed to a chair (including a wheelchair)? 3  -HP     Standing up from a chair using your arms (e.g., wheelchair, bedside chair)? 3  -HP     Climbing 3-5 steps with a railing? 3  -HP     To walk in hospital room? 3  -HP     AM-PAC 6 Clicks Score (PT) 20  -HP     Highest level of mobility 6 --> Walked 10 steps or more  -     Row Name 12/07/22 1423          PADD    Diagnosis 1  -HP     Gender 2  -HP     Age Group 2  -HP     Gait Distance 1  -HP     Assist Level 1  -HP     Home Support 3  -HP     PADD Score 10  -HP     Patient Preference home with outpatient rehab  -HP     Prediction by PADD Score directly home (with home health or out-patient rehab)  -     Row Name 12/07/22 1423          Functional Assessment    Outcome Measure Options AM-PAC 6 Clicks Basic Mobility (PT);PADD  -HP           User Key  (r) = Recorded By, (t) = Taken By, (c) = Cosigned By    Initials Name Provider Type    Namrata Laurent PT Physical Therapist                             Physical Therapy Education     Title: PT OT SLP Therapies (Done)     Topic: Physical Therapy  (Done)     Point: Mobility training (Done)     Learning Progress Summary           Patient Acceptance, E,D, VU,DU by  at 12/7/2022 1423                   Point: Home exercise program (Done)     Learning Progress Summary           Patient Acceptance, E,D, VU,DU by  at 12/7/2022 1423                   Point: Body mechanics (Done)     Learning Progress Summary           Patient Acceptance, E,D, VU,DU by  at 12/7/2022 1423                   Point: Precautions (Done)     Learning Progress Summary           Patient Acceptance, E,D, VU,DU by  at 12/7/2022 1423                               User Key     Initials Effective Dates Name Provider Type Discipline     06/01/21 -  Namrata Longo, PT Physical Therapist PT              PT Recommendation and Plan  Planned Therapy Interventions (PT): balance training, bed mobility training, gait training, home exercise program, patient/family education, transfer training, stretching, strengthening, stair training, ROM (range of motion)  Plan of Care Reviewed With: patient, spouse  Progress: no change  Outcome Evaluation: PT eval complete. Pt performed bed mobility Mod I. pt performed STS and amb 400' with FWW and CGAx2. Pt navigated 1 steps with CGAx2. No LOB or knee buckling noted. L knee ROM 9-95. HEP and precautions reviewed with pt. Recommend d/c home with assist and OPPT when medically appropriate. Pt cleared to d/c today from PT stand point. Will need FWW prior to d/c.     Time Calculation:    PT Charges     Row Name 12/07/22 1330             Time Calculation    Start Time 1330  -HP      PT Received On 12/07/22  -      PT Goal Re-Cert Due Date 12/17/22  -         Timed Charges    98016 - PT Therapeutic Exercise Minutes 10  -HP         Untimed Charges    PT Eval/Re-eval Minutes 50  -HP         Total Minutes    Timed Charges Total Minutes 10  -HP      Untimed Charges Total Minutes 50  -HP       Total Minutes 60  -HP            User Key  (r) = Recorded By, (t) = Taken  By, (c) = Cosigned By    Initials Name Provider Type    HP Namrata Longo, PT Physical Therapist              Therapy Charges for Today     Code Description Service Date Service Provider Modifiers Qty    53267677908 HC PT THER PROC EA 15 MIN 12/7/2022 Namrata Longo, PT GP 1    18303428533 HC PT EVAL LOW COMPLEXITY 4 12/7/2022 Namrata Longo, PT GP 1    18643831645 HC PT THER SUPP EA 15 MIN 12/7/2022 Namrata Longo, PT GP 3          PT G-Codes  Outcome Measure Options: AM-PAC 6 Clicks Basic Mobility (PT), PADD  AM-PAC 6 Clicks Score (PT): 20  PT Discharge Summary  Anticipated Discharge Disposition (PT): home with assist, home with outpatient therapy services    Namrata Longo PT  12/7/2022

## 2022-12-07 NOTE — ANESTHESIA POSTPROCEDURE EVALUATION
"Patient: Rojas Mcadams    Procedure Summary     Date: 12/07/22 Room / Location:  [facility]    Anesthesia Start: 0728 Anesthesia Stop: 1006    Procedure: TOTAL KNEE ARTHROPLASTY - LEFT (Left: Knee) Diagnosis:       Primary osteoarthritis of left knee      (Primary osteoarthritis of left knee [M17.12])    Surgeons: Delmer Hi MD Provider: Al Allen MD    Anesthesia Type: spinal ASA Status: 2          Anesthesia Type: spinal    Vitals  No vitals data found for the desired time range.          Post Anesthesia Care and Evaluation    Patient location during evaluation: PACU  Patient participation: complete - patient participated  Level of consciousness: awake and responsive to verbal stimuli  Pain score: 2  Pain management: adequate    Airway patency: patent  Anesthetic complications: No anesthetic complications    Cardiovascular status: acceptable  Respiratory status: acceptable  Hydration status: acceptable    Comments: Pt awake and responsive. SV. VSS. Report to RN. Patient Vitals in the past 24 hrs:  12/07/22 0638, BP:129/91, Temp:97.2 °F (36.2 °C), Temp src:Temporal, Pulse:68, Resp:16, SpO2:98 %, Height:185.4 cm (73\"), Weight:91.2 kg (201 lb)  133/78. p 72. r 16. t 98.1                  "

## 2022-12-22 ENCOUNTER — OFFICE VISIT (OUTPATIENT)
Dept: ORTHOPEDIC SURGERY | Facility: CLINIC | Age: 62
End: 2022-12-22

## 2022-12-22 VITALS — TEMPERATURE: 97.1 F

## 2022-12-22 DIAGNOSIS — Z96.652 STATUS POST TOTAL LEFT KNEE REPLACEMENT: Primary | ICD-10-CM

## 2022-12-22 PROCEDURE — 99024 POSTOP FOLLOW-UP VISIT: CPT | Performed by: ORTHOPAEDIC SURGERY

## 2022-12-22 RX ORDER — ZOLPIDEM TARTRATE 12.5 MG/1
12.5 TABLET, FILM COATED, EXTENDED RELEASE ORAL NIGHTLY PRN
Qty: 31 TABLET | Refills: 0 | Status: SHIPPED | OUTPATIENT
Start: 2022-12-22

## 2022-12-22 RX ORDER — HYDROCODONE BITARTRATE AND ACETAMINOPHEN 7.5; 325 MG/1; MG/1
1 TABLET ORAL EVERY 6 HOURS PRN
Qty: 30 TABLET | Refills: 0 | Status: SHIPPED | OUTPATIENT
Start: 2022-12-22

## 2022-12-22 NOTE — PROGRESS NOTES
[facility] [place] [facility] [place]        Subjective     Post-op (2 weeks s/p total left knee arthoplasty 12/7/22)       HPI    Rojas Mcadams is a 62 y.o. male.  Ezequiel returns the office today now 2 weeks out from left total knee arthroplasty on 12/7/2022.  He is doing his physical therapy at [facility] with Angela.  Doing well other than not sleeping very much.          Objective      Physical Exam  Temp 97.1 °F (36.2 °C) (Infrared)     There is no height or weight on file to calculate BMI.        Ortho Exam  Range of motion 0-120.  Incision is healing and free of erythema or drainage.  Calf soft and nontender.    Imaging Reviewed:  Imaging Results (Last 24 Hours)     Procedure Component Value Units Date/Time    XR Knee 3+ View With [place] Left [[id number]] Resulted: 12/22/22 1423     Updated: 12/22/22 1424    Narrative:      Knee X-ray    Indication: status-post TKA    Study:  AP, Lateral, and Sunrise views of Left knee    Comparison: Left knee x-ray 12/7/2022    Findings:  No signs of acute fracture are visualized  No signs of loosening are appreciated  Components are well aligned    Impression:  Status post Left total knee arthroplasty. No signs of   loosening or fracture.                Assessment    Assessment:  1. Status post total left knee replacement        Plan:  1. Status post left total knee arthroplasty--we will stop the oxycodone and call in some hydrocodone at his request.  Furthermore, to help him sleep, we will use a single month of 12.5 mg of Ambien CR.  He has been instructed to not take these 2 medications together.  Hopefully he is sleeping better when I see him in 3 to 4 weeks.  So far, he is exceeding expectations with regards to his recovery.      Delmer Hi MD  12/22/22  15:48 EST      Dictated Utilizing Dragon Dictation.

## 2023-01-12 ENCOUNTER — OFFICE VISIT (OUTPATIENT)
Dept: ORTHOPEDIC SURGERY | Facility: CLINIC | Age: 63
End: 2023-01-12
Payer: MEDICAID

## 2023-01-12 DIAGNOSIS — G57.82 SAPHENOUS NEURITIS, LEFT: ICD-10-CM

## 2023-01-12 DIAGNOSIS — Z96.652 STATUS POST TOTAL LEFT KNEE REPLACEMENT: Primary | ICD-10-CM

## 2023-01-12 PROCEDURE — 99024 POSTOP FOLLOW-UP VISIT: CPT | Performed by: ORTHOPAEDIC SURGERY

## 2023-01-12 RX ORDER — LIDOCAINE 50 MG/G
1 PATCH TOPICAL DAILY
Qty: 30 PATCH | Refills: 5 | Status: SHIPPED | OUTPATIENT
Start: 2023-01-12

## 2023-01-12 RX ORDER — GABAPENTIN 100 MG/1
100 CAPSULE ORAL 3 TIMES DAILY
Qty: 90 CAPSULE | Refills: 1 | Status: SHIPPED | OUTPATIENT
Start: 2023-01-12

## 2023-01-12 RX ORDER — TRAMADOL HYDROCHLORIDE 50 MG/1
50 TABLET ORAL EVERY 6 HOURS PRN
Qty: 30 TABLET | Refills: 1 | Status: SHIPPED | OUTPATIENT
Start: 2023-01-12

## 2023-01-12 ASSESSMENT — KOOS JR
KOOS JR SCORE: 57.14
KOOS JR SCORE: 12

## 2023-01-12 NOTE — PROGRESS NOTES
[facility] [place] [facility] [place]        Subjective     Follow-up (3 week follow up - 5 weeks s/p total left knee arthoplasty 12/7/22)       HPI    Rojas Mcadams is a 62 y.o. male.  Patient is now 5 weeks out from left total knee arthroplasty on 12/7/2022.  Doing well but he is not sleeping and could not tolerate the Ambien.  He tells me that he is having quite a bit of hypersensitivity in the anterior medial aspect of the left leg and knee.  I did stop his tramadol because we had him on oxycodone.  Has had to do more driving lately as he has started going back to work.          Objective      Physical Exam  There were no vitals taken for this visit.    There is no height or weight on file to calculate BMI.        Ortho Exam  Range of motion is 5-120.  Has some hypersensitivity and positive Tinel's over the saphenous nerve.  No induration or increased warmth    Imaging Reviewed:  Imaging Results (Last 24 Hours)     ** No results found for the last 24 hours. **            Assessment    Assessment:  1. Status post total left knee replacement    2. Saphenous neuritis, left        Plan:  1. Status post left total knee arthroplasty with postoperative saphenous neuritis--this is typically temporary.  This can sometimes be significant but we will start him on some Neurontin which he has been instructed to not stop cold turkey and needs to taper off if he is not going to continue with the medication.  We will start him on some tramadol and we will give him some Lidoderm patches to take during the daytime.  See him back in 6 weeks with an x-ray.      Delmer Hi MD  01/12/23  16:37 EST      Dictated Utilizing Dragon Dictation.

## 2023-02-23 ENCOUNTER — OFFICE VISIT (OUTPATIENT)
Dept: ORTHOPEDIC SURGERY | Facility: CLINIC | Age: 63
End: 2023-02-23
Payer: MEDICAID

## 2023-02-23 DIAGNOSIS — Z96.652 STATUS POST TOTAL LEFT KNEE REPLACEMENT: Primary | ICD-10-CM

## 2023-02-23 PROCEDURE — 99024 POSTOP FOLLOW-UP VISIT: CPT | Performed by: ORTHOPAEDIC SURGERY

## 2023-02-23 RX ORDER — DICLOFENAC SODIUM 75 MG/1
TABLET, DELAYED RELEASE ORAL
COMMUNITY
Start: 2023-02-16

## 2023-11-02 ENCOUNTER — PRE-ADMISSION TESTING (OUTPATIENT)
Dept: PREADMISSION TESTING | Facility: HOSPITAL | Age: 63
End: 2023-11-02
Payer: MEDICAID

## 2023-11-02 ENCOUNTER — OFFICE VISIT (OUTPATIENT)
Dept: ORTHOPEDIC SURGERY | Facility: CLINIC | Age: 63
End: 2023-11-02
Payer: MEDICAID

## 2023-11-02 VITALS
WEIGHT: 195.8 LBS | HEIGHT: 71 IN | SYSTOLIC BLOOD PRESSURE: 126 MMHG | BODY MASS INDEX: 27.41 KG/M2 | DIASTOLIC BLOOD PRESSURE: 88 MMHG

## 2023-11-02 VITALS — WEIGHT: 199.52 LBS | BODY MASS INDEX: 27.02 KG/M2 | HEIGHT: 72 IN

## 2023-11-02 DIAGNOSIS — M17.11 PRIMARY OSTEOARTHRITIS OF RIGHT KNEE: ICD-10-CM

## 2023-11-02 DIAGNOSIS — M17.11 PRIMARY OSTEOARTHRITIS OF RIGHT KNEE: Primary | ICD-10-CM

## 2023-11-02 LAB
ALBUMIN SERPL-MCNC: 4.5 G/DL (ref 3.5–5.2)
ALBUMIN/GLOB SERPL: 2 G/DL
ALP SERPL-CCNC: 65 U/L (ref 39–117)
ALT SERPL W P-5'-P-CCNC: 30 U/L (ref 1–41)
ANION GAP SERPL CALCULATED.3IONS-SCNC: 8 MMOL/L (ref 5–15)
AST SERPL-CCNC: 22 U/L (ref 1–40)
BASOPHILS # BLD AUTO: 0.04 10*3/MM3 (ref 0–0.2)
BASOPHILS NFR BLD AUTO: 0.5 % (ref 0–1.5)
BILIRUB SERPL-MCNC: 0.4 MG/DL (ref 0–1.2)
BUN SERPL-MCNC: 21 MG/DL (ref 8–23)
BUN/CREAT SERPL: 19.8 (ref 7–25)
CALCIUM SPEC-SCNC: 9.4 MG/DL (ref 8.6–10.5)
CHLORIDE SERPL-SCNC: 99 MMOL/L (ref 98–107)
CO2 SERPL-SCNC: 31 MMOL/L (ref 22–29)
CREAT SERPL-MCNC: 1.06 MG/DL (ref 0.76–1.27)
CRP SERPL-MCNC: <0.3 MG/DL (ref 0–0.5)
DEPRECATED RDW RBC AUTO: 43.6 FL (ref 37–54)
EGFRCR SERPLBLD CKD-EPI 2021: 79.8 ML/MIN/1.73
EOSINOPHIL # BLD AUTO: 0.17 10*3/MM3 (ref 0–0.4)
EOSINOPHIL NFR BLD AUTO: 2.2 % (ref 0.3–6.2)
ERYTHROCYTE [DISTWIDTH] IN BLOOD BY AUTOMATED COUNT: 12.5 % (ref 12.3–15.4)
ERYTHROCYTE [SEDIMENTATION RATE] IN BLOOD: 6 MM/HR (ref 0–20)
GLOBULIN UR ELPH-MCNC: 2.3 GM/DL
GLUCOSE SERPL-MCNC: 110 MG/DL (ref 65–99)
HBA1C MFR BLD: 5.8 % (ref 4.8–5.6)
HCT VFR BLD AUTO: 44.8 % (ref 37.5–51)
HGB BLD-MCNC: 15 G/DL (ref 13–17.7)
IMM GRANULOCYTES # BLD AUTO: 0.07 10*3/MM3 (ref 0–0.05)
IMM GRANULOCYTES NFR BLD AUTO: 0.9 % (ref 0–0.5)
INR PPP: 1.02 (ref 0.89–1.12)
LYMPHOCYTES # BLD AUTO: 1.9 10*3/MM3 (ref 0.7–3.1)
LYMPHOCYTES NFR BLD AUTO: 24.1 % (ref 19.6–45.3)
MCH RBC QN AUTO: 31.6 PG (ref 26.6–33)
MCHC RBC AUTO-ENTMCNC: 33.5 G/DL (ref 31.5–35.7)
MCV RBC AUTO: 94.5 FL (ref 79–97)
MONOCYTES # BLD AUTO: 0.6 10*3/MM3 (ref 0.1–0.9)
MONOCYTES NFR BLD AUTO: 7.6 % (ref 5–12)
NEUTROPHILS NFR BLD AUTO: 5.11 10*3/MM3 (ref 1.7–7)
NEUTROPHILS NFR BLD AUTO: 64.7 % (ref 42.7–76)
NRBC BLD AUTO-RTO: 0 /100 WBC (ref 0–0.2)
PLATELET # BLD AUTO: 225 10*3/MM3 (ref 140–450)
PMV BLD AUTO: 9.4 FL (ref 6–12)
POTASSIUM SERPL-SCNC: 4.4 MMOL/L (ref 3.5–5.2)
PROT SERPL-MCNC: 6.8 G/DL (ref 6–8.5)
PROTHROMBIN TIME: 13.5 SECONDS (ref 12.2–14.5)
QT INTERVAL: 392 MS
QTC INTERVAL: 397 MS
RBC # BLD AUTO: 4.74 10*6/MM3 (ref 4.14–5.8)
SODIUM SERPL-SCNC: 138 MMOL/L (ref 136–145)
WBC NRBC COR # BLD: 7.89 10*3/MM3 (ref 3.4–10.8)

## 2023-11-02 PROCEDURE — 93010 ELECTROCARDIOGRAM REPORT: CPT | Performed by: INTERNAL MEDICINE

## 2023-11-02 PROCEDURE — 85652 RBC SED RATE AUTOMATED: CPT

## 2023-11-02 PROCEDURE — 93005 ELECTROCARDIOGRAM TRACING: CPT

## 2023-11-02 PROCEDURE — 85025 COMPLETE CBC W/AUTO DIFF WBC: CPT

## 2023-11-02 PROCEDURE — 83036 HEMOGLOBIN GLYCOSYLATED A1C: CPT

## 2023-11-02 PROCEDURE — 86140 C-REACTIVE PROTEIN: CPT

## 2023-11-02 PROCEDURE — 85610 PROTHROMBIN TIME: CPT

## 2023-11-02 PROCEDURE — 80053 COMPREHEN METABOLIC PANEL: CPT

## 2023-11-02 PROCEDURE — 36415 COLL VENOUS BLD VENIPUNCTURE: CPT

## 2023-11-02 NOTE — PAT
Patient viewed general PAT education video as instructed in their preoperative information received from their surgeon.  Patient stated the general PAT education video was viewed in its entirety and survey completed.  Copies of PAT general education handouts (Incentive Spirometry, Meds to Beds Program, Patient Belongings, Pre-op skin preparation instructions, Blood Glucose testing, Visitor policy, Surgery FAQ, Code H) distributed to patient if not printed. Education related to the PAT pass and skin preparation for surgery (if applicable) completed in PAT as a reinforcement to PAT education video. Patient instructed to return PAT pass provided today as well as completed skin preparation sheet (if applicable) on the day of procedure.     Additionally if patient had not viewed video yet but intended to view it at home or in our waiting area, then referred them to the handout with QR code/link provided during PAT visit.  Instructed patient to complete survey after viewing the video in its entirety.  Encouraged patient/family to read [facility] general education handouts thoroughly and notify PAT staff with any questions or concerns. Patient verbalized understanding of all information and priority content.    Discussed with patient options for receiving total joint replacement education and assessed patient's ability and preference. Joint Replacement Guide given to patient during PAT visit since not received a copy within the last year. Encouraged patient/family to read guide thoroughly and notify PAT staff with any questions or concerns. Handout provided directing patient to links to watch online videos related to joint replacement surgery on the [facility] website. The handout gives detailed instructions for joining an online joint replacement class through Zoom or phone conference offered on Thursdays. Patient agreed to participate by watching videos online. Patient verbalized understanding of instructions and to  complete the online learning tool survey. Encouraged to share information with family and/or . An overview of the joint replacement education was provided during the visit including general perioperative instructions that are routine for all surgical patients (PAT PASS, wipes, directions to pre-op, etc.).    An arrival time for procedure was not provided during PAT visit. If patient had any questions or concerns about their arrival time, they were instructed to contact their surgeon/physician.  Additionally, if the patient referred to an arrival time that was acquired from their my chart account, patient was encouraged to verify that time with their surgeon/physician. Arrival times are NOT provided in Pre Admission Testing Department.    Patient denies any current skin issues.     Patient instructed to drink 20 ounces of Gatorade or Gatorlyte (if diabetic) and it needs to be completed 1 hour (for Main OR patients) or 2 hours (scheduled  section & [facility] patients) before given arrival time for procedure (NO RED Gatorade and NO Gatorade Zero).    Patient verbalized understanding.    Prescription for Chlorhexidine shower called into patient's pharmacy or [facility] pharmacy by patient's surgeon.  Reinforced with patient to  the prescription from applicable pharmacy if they haven't already.  Verbal and written instructions given regarding proper use of Chlorhexidine body wash to patient and/or famlily during PAT visit. Patient/family also instructed to complete checklist and return it to Pre-op on the day of surgery.  Patient and/or family verbalized understanding.    Patient to apply Chlorhexadine wipes  to surgical area (as instructed) the night before procedure and the AM of procedure. Wipes provided.    [employer] (by [employer]) pain pump patient informational handout given to patient.  Instructed patient to watch [employer] Patient Education Video regarding Peripheral Nerve Catheter that will be in  place for upcoming surgery unless contraindicated. The video can be accessed using QR code noted on handout.  Patient agreed to watch video.  Stressed to patient to call [facility] Nursing Hotline 24/7 if patient has any questions or concerns after discharge.     Post-Surgery Information Instruction Sheet given to patient during Pre-Admission Testing Visit with verbal instructions to patient to return with PAT PASS on the day of surgery. Additionally, encouraged patient to review the information provided.    Patient instructed to bring CPAP mask and tubing to the hospital for overnight stay.  Explained that it is not necessary to bring their CPAP machine to the hospital instead a CPAP machine will be provided for use by the hospital. If patient knows their CPAP settings, those settings will be implemented.  If not, the CPAP machine will be utilized on the auto setting using their mask and tubing.    Patient verbalized understanding.

## 2023-11-02 NOTE — PROGRESS NOTES
"    [facility] Clinic Note        Subjective     CC: Follow-up (3.5 month follow-up: Primary osteoarthritis of right knee; DOS: 11/15/23)      HPI    Rojas Mcadams is a 63 y.o. male.  Patient returns to the office today for his preop appointment before right TKA on 11/15/2023.  He is doing a lot of traveling for work.  Have a lot of medial sided knee pain.    Overall, patient's symptoms are as above.    ROS:    Constiutional:Pt denies fever, chills, nausea, or vomiting.  MSK:as above        Objective      Past Medical History  Past Medical History:   Diagnosis Date    Arthritis     Disease of thyroid gland     Frozen shoulder 20 years    GA (granuloma annulare)     GERD (gastroesophageal reflux disease)     Hyperlipidemia     Hypertension     Knee swelling 20 years    Periarthritis of shoulder 20 years    Rotator cuff syndrome 15 years    Sleep apnea     cpap compliant    Spinal headache     Tennis elbow 15yrs    Wears eyeglasses      Social History     Socioeconomic History    Marital status:    Tobacco Use    Smoking status: Former     Packs/day: 1.00     Years: 15.00     Additional pack years: 0.00     Total pack years: 15.00     Types: Cigarettes     Start date: 1/1/1978     Quit date: 5/15/2013     Years since quitting: 10.4    Smokeless tobacco: Never   Vaping Use    Vaping Use: Never used   Substance and Sexual Activity    Alcohol use: Not Currently     Comment: rare social    Drug use: No    Sexual activity: Yes     Partners: Female     Birth control/protection: None          Physical Exam  /88   Ht 180.3 cm (70.98\")   Wt 88.8 kg (195 lb 12.8 oz)   BMI 27.32 kg/m²     Body mass index is 27.32 kg/m².    Patient is well nourished and well developed.        Ortho Exam      Musculoskeletal:  Global Assessment:  Overall assessment of Lower Extremity Muscle Strength and Tone:  Right quadriceps--5/5   Right hamstrings--5/5       Right tibialis anterior--5/5  Right " gastroc-soleus--5/5  Right EHL --5/5    Lower Extremity:    Inspection and Palpation:  Right knee:  Tenderness:  Over the medial joint line and moderate severity  Effusion:  1+  Crepitus:  Positive  Pulses:  2+  Ecchymosis:  None  Warmth:  None     ROM:  Right:  Extension: 5    Flexion:120    Instability:    Right:  Lachman Test:  Negative   Varus stress test negative   Valgus stress test negative    Deformities/Malalignments/Discrepancies:    Left:  No deformities   Right:  Genu Varum    Functional Testing:  Judith's test:  Negative  Patella grind test:  Positive  Q-angle:  normal        Imaging/Labs/EMG Reviewed:  Imaging Results (Last 24 Hours)       ** No results found for the last 24 hours. **              Assessment    Assessment:  1. Primary osteoarthritis of right knee        Plan:  Recommend over the counter anti-inflammatories for pain and/or swelling  Right knee arthritis--end-stage medial and patellofemoral disease.  Risk, benefits, potential hazards, typical recovery and rehab as well as reason alternatives to right TKA were discussed with him.  Because of his age, we will attempt a press-fit this side as well.  We have told him about the new implant and also the robotic assistant that will be used.  He will do the surgery as an outpatient.  Eliquis for DVT prophylaxis because of her family history.  We will [facility] immediate outpatient [facility] in [place].      Delmer Hi MD  11/02/23  13:38 EDT      Dictated Utilizing Dragon Dictation.

## 2023-11-14 ENCOUNTER — ANESTHESIA EVENT (OUTPATIENT)
Dept: PERIOP | Facility: HOSPITAL | Age: 63
End: 2023-11-14
Payer: MEDICAID

## 2023-11-14 RX ORDER — FAMOTIDINE 10 MG/ML
20 INJECTION, SOLUTION INTRAVENOUS ONCE
Status: CANCELLED | OUTPATIENT
Start: 2023-11-14 | End: 2023-11-14

## 2023-11-14 RX ORDER — SODIUM CHLORIDE 9 MG/ML
40 INJECTION, SOLUTION INTRAVENOUS AS NEEDED
Status: CANCELLED | OUTPATIENT
Start: 2023-11-14

## 2023-11-14 RX ORDER — SODIUM CHLORIDE 0.9 % (FLUSH) 0.9 %
10 SYRINGE (ML) INJECTION AS NEEDED
Status: CANCELLED | OUTPATIENT
Start: 2023-11-14

## 2023-11-14 RX ORDER — SODIUM CHLORIDE 0.9 % (FLUSH) 0.9 %
10 SYRINGE (ML) INJECTION EVERY 12 HOURS SCHEDULED
Status: CANCELLED | OUTPATIENT
Start: 2023-11-14

## 2023-11-15 ENCOUNTER — APPOINTMENT (OUTPATIENT)
Dept: GENERAL RADIOLOGY | Facility: HOSPITAL | Age: 63
End: 2023-11-15
Payer: MEDICAID

## 2023-11-15 ENCOUNTER — ANESTHESIA EVENT CONVERTED (OUTPATIENT)
Dept: ANESTHESIOLOGY | Facility: HOSPITAL | Age: 63
End: 2023-11-15
Payer: MEDICAID

## 2023-11-15 ENCOUNTER — HOSPITAL ENCOUNTER (OUTPATIENT)
Facility: HOSPITAL | Age: 63
Discharge: HOME | End: 2023-11-15
Attending: ORTHOPAEDIC SURGERY | Admitting: ORTHOPAEDIC SURGERY
Payer: MEDICAID

## 2023-11-15 ENCOUNTER — ANESTHESIA (OUTPATIENT)
Dept: PERIOP | Facility: HOSPITAL | Age: 63
End: 2023-11-15
Payer: MEDICAID

## 2023-11-15 VITALS
WEIGHT: 199 LBS | BODY MASS INDEX: 26.95 KG/M2 | HEIGHT: 72 IN | TEMPERATURE: 97.6 F | RESPIRATION RATE: 16 BRPM | DIASTOLIC BLOOD PRESSURE: 98 MMHG | SYSTOLIC BLOOD PRESSURE: 145 MMHG | OXYGEN SATURATION: 95 % | HEART RATE: 78 BPM

## 2023-11-15 DIAGNOSIS — M17.11 PRIMARY OSTEOARTHRITIS OF RIGHT KNEE: ICD-10-CM

## 2023-11-15 DIAGNOSIS — Z96.651 STATUS POST TOTAL RIGHT KNEE REPLACEMENT: Primary | ICD-10-CM

## 2023-11-15 PROBLEM — R73.09 ELEVATED HEMOGLOBIN A1C: Status: ACTIVE | Noted: 2023-11-15

## 2023-11-15 PROBLEM — E03.9 HYPOTHYROID: Status: ACTIVE | Noted: 2023-11-15

## 2023-11-15 PROBLEM — G47.33 OSA ON CPAP: Status: ACTIVE | Noted: 2023-11-15

## 2023-11-15 PROBLEM — I10 HTN (HYPERTENSION): Status: ACTIVE | Noted: 2023-11-15

## 2023-11-15 PROBLEM — E78.5 HLD (HYPERLIPIDEMIA): Status: ACTIVE | Noted: 2023-11-15

## 2023-11-15 LAB
GLUCOSE BLDC GLUCOMTR-MCNC: 100 MG/DL (ref 70–130)
POTASSIUM SERPL-SCNC: 4.4 MMOL/L (ref 3.5–5.2)

## 2023-11-15 PROCEDURE — C1776 JOINT DEVICE (IMPLANTABLE): HCPCS | Performed by: ORTHOPAEDIC SURGERY

## 2023-11-15 PROCEDURE — C1713 ANCHOR/SCREW BN/BN,TIS/BN: HCPCS | Performed by: ORTHOPAEDIC SURGERY

## 2023-11-15 PROCEDURE — 73560 X-RAY EXAM OF KNEE 1 OR 2: CPT

## 2023-11-15 PROCEDURE — 25010000002 DEXAMETHASONE PER 1 MG: Performed by: NURSE ANESTHETIST, CERTIFIED REGISTERED

## 2023-11-15 PROCEDURE — 25010000002 BUPIVACAINE (PF) 0.25 % SOLUTION: Performed by: ANESTHESIOLOGY

## 2023-11-15 PROCEDURE — 25010000002 PROPOFOL 10 MG/ML EMULSION: Performed by: NURSE ANESTHETIST, CERTIFIED REGISTERED

## 2023-11-15 PROCEDURE — 25010000002 ROPIVACAINE PER 1 MG: Performed by: ORTHOPAEDIC SURGERY

## 2023-11-15 PROCEDURE — C1755 CATHETER, INTRASPINAL: HCPCS | Performed by: ORTHOPAEDIC SURGERY

## 2023-11-15 PROCEDURE — 25010000002 BUPIVACAINE 0.5 % SOLUTION: Performed by: ANESTHESIOLOGY

## 2023-11-15 PROCEDURE — 25010000002 MORPHINE PER 10 MG: Performed by: ORTHOPAEDIC SURGERY

## 2023-11-15 PROCEDURE — 25010000002 ROPIVACAINE PER 1 MG: Performed by: NURSE ANESTHETIST, CERTIFIED REGISTERED

## 2023-11-15 PROCEDURE — 25010000002 KETOROLAC TROMETHAMINE PER 15 MG: Performed by: ORTHOPAEDIC SURGERY

## 2023-11-15 PROCEDURE — 25810000003 LACTATED RINGERS PER 1000 ML: Performed by: ANESTHESIOLOGY

## 2023-11-15 PROCEDURE — 25010000002 CEFAZOLIN PER 500 MG: Performed by: ORTHOPAEDIC SURGERY

## 2023-11-15 PROCEDURE — 84132 ASSAY OF SERUM POTASSIUM: CPT | Performed by: ORTHOPAEDIC SURGERY

## 2023-11-15 PROCEDURE — 97161 PT EVAL LOW COMPLEX 20 MIN: CPT

## 2023-11-15 PROCEDURE — 82948 REAGENT STRIP/BLOOD GLUCOSE: CPT

## 2023-11-15 DEVICE — IMPLANTABLE DEVICE
Type: IMPLANTABLE DEVICE | Site: KNEE | Status: FUNCTIONAL
Brand: PERSONA® THE PERSONALIZED KNEE® OSSEOTI®

## 2023-11-15 DEVICE — IMPLANTABLE DEVICE
Type: IMPLANTABLE DEVICE | Site: KNEE | Status: FUNCTIONAL
Brand: PERSONA® TRABECULAR METAL®

## 2023-11-15 DEVICE — WAX BONE HEMO AESCULAP 2.5GM: Type: IMPLANTABLE DEVICE | Site: KNEE | Status: FUNCTIONAL

## 2023-11-15 DEVICE — IMPLANTABLE DEVICE
Type: IMPLANTABLE DEVICE | Site: KNEE | Status: FUNCTIONAL
Brand: NEXGEN® TRABECULAR METAL®

## 2023-11-15 DEVICE — DEV CONTRL TISS STRATAFIX SYMM PDS PLUS VIL CT-1 45CM: Type: IMPLANTABLE DEVICE | Site: KNEE | Status: FUNCTIONAL

## 2023-11-15 DEVICE — IMPLANTABLE DEVICE
Type: IMPLANTABLE DEVICE | Site: KNEE | Status: FUNCTIONAL
Brand: PERSONA® VIVACIT-E®

## 2023-11-15 DEVICE — CAP TOTL KN POROUS/HYBRID PRIMARY W/ROSA: Type: IMPLANTABLE DEVICE | Site: KNEE | Status: FUNCTIONAL

## 2023-11-15 RX ORDER — MONTELUKAST SODIUM 10 MG/1
10 TABLET ORAL NIGHTLY
Status: DISCONTINUED | OUTPATIENT
Start: 2023-11-15 | End: 2023-11-15 | Stop reason: HOSPADM

## 2023-11-15 RX ORDER — FENTANYL CITRATE 50 UG/ML
50 INJECTION, SOLUTION INTRAMUSCULAR; INTRAVENOUS
Status: DISCONTINUED | OUTPATIENT
Start: 2023-11-15 | End: 2023-11-15 | Stop reason: HOSPADM

## 2023-11-15 RX ORDER — PANTOPRAZOLE SODIUM 40 MG/1
40 TABLET, DELAYED RELEASE ORAL
Status: DISCONTINUED | OUTPATIENT
Start: 2023-11-16 | End: 2023-11-15 | Stop reason: HOSPADM

## 2023-11-15 RX ORDER — ACETAMINOPHEN 500 MG
1000 TABLET ORAL ONCE
Status: COMPLETED | OUTPATIENT
Start: 2023-11-15 | End: 2023-11-15

## 2023-11-15 RX ORDER — TRANEXAMIC ACID 10 MG/ML
1000 INJECTION, SOLUTION INTRAVENOUS ONCE
Status: COMPLETED | OUTPATIENT
Start: 2023-11-15 | End: 2023-11-15

## 2023-11-15 RX ORDER — METOPROLOL SUCCINATE 50 MG/1
50 TABLET, EXTENDED RELEASE ORAL DAILY
Status: DISCONTINUED | OUTPATIENT
Start: 2023-11-15 | End: 2023-11-15 | Stop reason: HOSPADM

## 2023-11-15 RX ORDER — ACETAMINOPHEN 325 MG/1
650 TABLET ORAL EVERY 8 HOURS
Qty: 180 TABLET | Refills: 0 | Status: SHIPPED | OUTPATIENT
Start: 2023-11-15

## 2023-11-15 RX ORDER — SODIUM CHLORIDE, SODIUM LACTATE, POTASSIUM CHLORIDE, CALCIUM CHLORIDE 600; 310; 30; 20 MG/100ML; MG/100ML; MG/100ML; MG/100ML
9 INJECTION, SOLUTION INTRAVENOUS CONTINUOUS
Status: DISCONTINUED | OUTPATIENT
Start: 2023-11-15 | End: 2023-11-15 | Stop reason: HOSPADM

## 2023-11-15 RX ORDER — IBUPROFEN 600 MG/1
600 TABLET ORAL EVERY 6 HOURS PRN
Status: DISCONTINUED | OUTPATIENT
Start: 2023-11-15 | End: 2023-11-15 | Stop reason: HOSPADM

## 2023-11-15 RX ORDER — ONDANSETRON 4 MG/1
4 TABLET, FILM COATED ORAL EVERY 8 HOURS PRN
Qty: 15 TABLET | Refills: 1 | Status: SHIPPED | OUTPATIENT
Start: 2023-11-15

## 2023-11-15 RX ORDER — ESCITALOPRAM OXALATE 10 MG/1
10 TABLET ORAL DAILY
Status: DISCONTINUED | OUTPATIENT
Start: 2023-11-15 | End: 2023-11-15 | Stop reason: HOSPADM

## 2023-11-15 RX ORDER — LIDOCAINE HYDROCHLORIDE 10 MG/ML
INJECTION, SOLUTION EPIDURAL; INFILTRATION; INTRACAUDAL; PERINEURAL AS NEEDED
Status: DISCONTINUED | OUTPATIENT
Start: 2023-11-15 | End: 2023-11-15 | Stop reason: SURG

## 2023-11-15 RX ORDER — PROPOFOL 10 MG/ML
VIAL (ML) INTRAVENOUS AS NEEDED
Status: DISCONTINUED | OUTPATIENT
Start: 2023-11-15 | End: 2023-11-15 | Stop reason: SURG

## 2023-11-15 RX ORDER — LIDOCAINE HYDROCHLORIDE 10 MG/ML
0.5 INJECTION, SOLUTION EPIDURAL; INFILTRATION; INTRACAUDAL; PERINEURAL ONCE AS NEEDED
Status: COMPLETED | OUTPATIENT
Start: 2023-11-15 | End: 2023-11-15

## 2023-11-15 RX ORDER — ONDANSETRON 4 MG/1
4 TABLET, FILM COATED ORAL ONCE AS NEEDED
Status: DISCONTINUED | OUTPATIENT
Start: 2023-11-15 | End: 2023-11-15 | Stop reason: HOSPADM

## 2023-11-15 RX ORDER — DEXAMETHASONE SODIUM PHOSPHATE 4 MG/ML
INJECTION, SOLUTION INTRA-ARTICULAR; INTRALESIONAL; INTRAMUSCULAR; INTRAVENOUS; SOFT TISSUE AS NEEDED
Status: DISCONTINUED | OUTPATIENT
Start: 2023-11-15 | End: 2023-11-15 | Stop reason: SURG

## 2023-11-15 RX ORDER — MAGNESIUM HYDROXIDE 1200 MG/15ML
LIQUID ORAL AS NEEDED
Status: DISCONTINUED | OUTPATIENT
Start: 2023-11-15 | End: 2023-11-15 | Stop reason: HOSPADM

## 2023-11-15 RX ORDER — ONDANSETRON 2 MG/ML
4 INJECTION INTRAMUSCULAR; INTRAVENOUS EVERY 6 HOURS PRN
Status: DISCONTINUED | OUTPATIENT
Start: 2023-11-15 | End: 2023-11-15 | Stop reason: HOSPADM

## 2023-11-15 RX ORDER — ROSUVASTATIN CALCIUM 10 MG/1
10 TABLET, COATED ORAL NIGHTLY
Status: DISCONTINUED | OUTPATIENT
Start: 2023-11-15 | End: 2023-11-15 | Stop reason: HOSPADM

## 2023-11-15 RX ORDER — ROPIVACAINE HYDROCHLORIDE 2 MG/ML
1 INJECTION, SOLUTION EPIDURAL; INFILTRATION; PERINEURAL CONTINUOUS
Start: 2023-11-15

## 2023-11-15 RX ORDER — OXYCODONE HYDROCHLORIDE AND ACETAMINOPHEN 5; 325 MG/1; MG/1
1 TABLET ORAL ONCE AS NEEDED
Status: DISCONTINUED | OUTPATIENT
Start: 2023-11-15 | End: 2023-11-15 | Stop reason: HOSPADM

## 2023-11-15 RX ORDER — DOCUSATE SODIUM 100 MG/1
100 CAPSULE, LIQUID FILLED ORAL 2 TIMES DAILY PRN
Qty: 60 CAPSULE | Refills: 0 | Status: SHIPPED | OUTPATIENT
Start: 2023-11-15

## 2023-11-15 RX ORDER — FAMOTIDINE 20 MG/1
20 TABLET, FILM COATED ORAL ONCE
Status: COMPLETED | OUTPATIENT
Start: 2023-11-15 | End: 2023-11-15

## 2023-11-15 RX ORDER — THYROID 30 MG/1
30 TABLET ORAL DAILY
Status: DISCONTINUED | OUTPATIENT
Start: 2023-11-15 | End: 2023-11-15 | Stop reason: HOSPADM

## 2023-11-15 RX ORDER — LABETALOL HYDROCHLORIDE 5 MG/ML
10 INJECTION, SOLUTION INTRAVENOUS EVERY 4 HOURS PRN
Status: DISCONTINUED | OUTPATIENT
Start: 2023-11-15 | End: 2023-11-15 | Stop reason: HOSPADM

## 2023-11-15 RX ORDER — BUPIVACAINE HYDROCHLORIDE 2.5 MG/ML
INJECTION, SOLUTION EPIDURAL; INFILTRATION; INTRACAUDAL
Status: COMPLETED | OUTPATIENT
Start: 2023-11-15 | End: 2023-11-15

## 2023-11-15 RX ORDER — MIDAZOLAM HYDROCHLORIDE 1 MG/ML
1 INJECTION INTRAMUSCULAR; INTRAVENOUS
Status: DISCONTINUED | OUTPATIENT
Start: 2023-11-15 | End: 2023-11-15 | Stop reason: HOSPADM

## 2023-11-15 RX ORDER — ONDANSETRON 2 MG/ML
4 INJECTION INTRAMUSCULAR; INTRAVENOUS ONCE AS NEEDED
Status: DISCONTINUED | OUTPATIENT
Start: 2023-11-15 | End: 2023-11-15 | Stop reason: HOSPADM

## 2023-11-15 RX ORDER — ROPIVACAINE HYDROCHLORIDE 2 MG/ML
INJECTION, SOLUTION EPIDURAL; INFILTRATION; PERINEURAL CONTINUOUS
Status: DISCONTINUED | OUTPATIENT
Start: 2023-11-15 | End: 2023-11-15 | Stop reason: HOSPADM

## 2023-11-15 RX ORDER — MELOXICAM 7.5 MG/1
7.5 TABLET ORAL DAILY
Qty: 30 TABLET | Refills: 0 | Status: SHIPPED | OUTPATIENT
Start: 2023-11-15

## 2023-11-15 RX ORDER — OXYCODONE HCL 10 MG/1
10 TABLET, FILM COATED, EXTENDED RELEASE ORAL ONCE
Status: COMPLETED | OUTPATIENT
Start: 2023-11-15 | End: 2023-11-15

## 2023-11-15 RX ORDER — OXYCODONE HYDROCHLORIDE 5 MG/1
5 TABLET ORAL EVERY 6 HOURS PRN
Qty: 30 TABLET | Refills: 0 | Status: SHIPPED | OUTPATIENT
Start: 2023-11-15

## 2023-11-15 RX ORDER — BUPIVACAINE HYDROCHLORIDE 5 MG/ML
INJECTION, SOLUTION PERINEURAL
Status: COMPLETED | OUTPATIENT
Start: 2023-11-15 | End: 2023-11-15

## 2023-11-15 RX ADMIN — TRANEXAMIC ACID 1000 MG: 10 INJECTION, SOLUTION INTRAVENOUS at 07:35

## 2023-11-15 RX ADMIN — OXYCODONE HYDROCHLORIDE AND ACETAMINOPHEN 1 TABLET: 5; 325 TABLET ORAL at 16:40

## 2023-11-15 RX ADMIN — FAMOTIDINE 20 MG: 20 TABLET, FILM COATED ORAL at 06:34

## 2023-11-15 RX ADMIN — PROPOFOL 50 MG: 10 INJECTION, EMULSION INTRAVENOUS at 07:28

## 2023-11-15 RX ADMIN — LIDOCAINE HYDROCHLORIDE 0.5 ML: 10 INJECTION, SOLUTION EPIDURAL; INFILTRATION; INTRACAUDAL; PERINEURAL at 06:34

## 2023-11-15 RX ADMIN — OXYCODONE HYDROCHLORIDE 10 MG: 10 TABLET, FILM COATED, EXTENDED RELEASE ORAL at 06:34

## 2023-11-15 RX ADMIN — BUPIVACAINE HYDROCHLORIDE 20 ML: 2.5 INJECTION, SOLUTION EPIDURAL; INFILTRATION; INTRACAUDAL; PERINEURAL at 10:25

## 2023-11-15 RX ADMIN — ESCITALOPRAM OXALATE 10 MG: 10 TABLET ORAL at 15:14

## 2023-11-15 RX ADMIN — DEXAMETHASONE SODIUM PHOSPHATE 4 MG: 4 INJECTION, SOLUTION INTRAMUSCULAR; INTRAVENOUS at 07:32

## 2023-11-15 RX ADMIN — THYROID, PORCINE 30 MG: 30 TABLET ORAL at 15:14

## 2023-11-15 RX ADMIN — SODIUM CHLORIDE, POTASSIUM CHLORIDE, SODIUM LACTATE AND CALCIUM CHLORIDE 9 ML/HR: 600; 310; 30; 20 INJECTION, SOLUTION INTRAVENOUS at 06:34

## 2023-11-15 RX ADMIN — SODIUM CHLORIDE 2 G: 900 INJECTION INTRAVENOUS at 15:15

## 2023-11-15 RX ADMIN — SODIUM CHLORIDE 2 G: 900 INJECTION INTRAVENOUS at 07:27

## 2023-11-15 RX ADMIN — ACETAMINOPHEN 1000 MG: 500 TABLET ORAL at 06:34

## 2023-11-15 RX ADMIN — METOPROLOL SUCCINATE 50 MG: 50 TABLET, EXTENDED RELEASE ORAL at 15:14

## 2023-11-15 RX ADMIN — ROPIVACAINE HYDROCHLORIDE 1000 MG: 2 INJECTION, SOLUTION EPIDURAL; INFILTRATION at 10:44

## 2023-11-15 RX ADMIN — TRANEXAMIC ACID 1000 MG: 10 INJECTION, SOLUTION INTRAVENOUS at 09:21

## 2023-11-15 RX ADMIN — SODIUM CHLORIDE, POTASSIUM CHLORIDE, SODIUM LACTATE AND CALCIUM CHLORIDE: 600; 310; 30; 20 INJECTION, SOLUTION INTRAVENOUS at 08:56

## 2023-11-15 RX ADMIN — PROPOFOL 100 MCG/KG/MIN: 10 INJECTION, EMULSION INTRAVENOUS at 07:28

## 2023-11-15 RX ADMIN — BUPIVACAINE HYDROCHLORIDE 2 ML: 5 INJECTION, SOLUTION PERINEURAL at 07:30

## 2023-11-15 RX ADMIN — LIDOCAINE HYDROCHLORIDE 50 MG: 10 INJECTION, SOLUTION EPIDURAL; INFILTRATION; INTRACAUDAL; PERINEURAL at 07:28

## 2023-11-15 NOTE — H&P
Patient Name: Rojas Mcadams  MRN: [id number]  : 1960  DOS: 11/15/2023    Attending: Delmer iH,*    Primary Care Provider: James Baumann MD      Chief complaint:  Right knee pain    Subjective   Patient is a pleasant 63 y.o. male presented for scheduled surgery by Dr. Hi. He underwent  right total knee arthroplasty under spinal anesthesia. He tolerated surgery well and was admitted for further medical management.  His knee has been painful for several years.  He has been using a brace with ambulation.  He denies recent falls.    When seen postop he is doing well.  His pain is well controlled.  He denies nausea, shortness of breath or chest pain.  No history of DVT or PE.      Allergies:  Allergies   Allergen Reactions    Chantix [Varenicline] Hives     In combination with wellbutrin     Wellbutrin [Bupropion] Rash       Meds:  Medications Prior to Admission   Medication Sig Dispense Refill Last Dose    escitalopram (LEXAPRO) 10 MG tablet Take 1 tablet by mouth Daily.   2023 at 0800    fluticasone (CUTIVATE) 0.05 % cream Apply 1 application  topically to the appropriate area as directed Daily.   2023 at 1000    ketoconazole (NIZORAL) 2 % cream Apply 1 application  topically to the appropriate area as directed As Needed.   Past Week    metoprolol succinate XL (TOPROL-XL) 50 MG 24 hr tablet Take 1 tablet by mouth Daily.   2023 at 0800    montelukast (SINGULAIR) 10 MG tablet Take 1 tablet by mouth Every Night.   2023 at     NP Thyroid 30 MG tablet Take 1 tablet by mouth Daily.   2023 at 0800    omeprazole (priLOSEC) 40 MG capsule Take 1 capsule by mouth 1 (One) Time As Needed (gerd).   Past Week    rosuvastatin (CRESTOR) 10 MG tablet Take 1 tablet by mouth Every Night.   2023 at 2000    [DISCONTINUED] acetaminophen (TYLENOL) 325 MG tablet Take 2 tablets by mouth Every 6 (Six) Hours As Needed for Mild Pain. 60 tablet 1 2023     [DISCONTINUED] chlorhexidine (HIBICLENS) 4 % external liquid Apply  topically to the appropriate area as directed Daily. Shower w/ solution for 5 days before surgery. Bring to [facility] to be filled. 236 mL 0 11/14/2023 at 2000    [DISCONTINUED] cyclobenzaprine (FLEXERIL) 10 MG tablet Take 1 tablet by mouth As Needed for Muscle Spasms.   11/14/2023 at 2000    [DISCONTINUED] methylPREDNISolone (MEDROL) 4 MG dose pack Take 1 tablet by mouth Daily. Use as directed by package instructions 21 tablet 0 Past Week    gabapentin (NEURONTIN) 100 MG capsule TAKE ONE CAPSULE BY MOUTH THREE TIMES A DAY (Patient taking differently: Take 1 capsule by mouth.) 90 capsule 0 More than a month    lidocaine (LIDODERM) 5 % Place 1 patch on the skin as directed by provider Daily. Remove & Discard patch within 12 hours or as directed by MD 30 patch 5 11/12/2023    Testosterone Cypionate (DEPOTESTOTERONE CYPIONATE) 200 MG/ML injection Inject 1 mL into the appropriate muscle as directed by prescriber Every 14 (Fourteen) Days.   11/1/2023    [DISCONTINUED] traMADol (ULTRAM) 50 MG tablet Take 1 tablet by mouth Every 6 (Six) Hours As Needed for Severe Pain. 30 tablet 1 11/12/2023 at 2000         History:   Past Medical History:   Diagnosis Date    Arthritis     Disease of thyroid gland     Frozen shoulder 20 years    GA (granuloma annulare)     GERD (gastroesophageal reflux disease)     Hyperlipidemia     Hypertension     Knee swelling 20 years    Periarthritis of shoulder 20 years    Rotator cuff syndrome 15 years    Sleep apnea     cpap compliant    Spinal headache     Tennis elbow 15yrs    Wears eyeglasses      Past Surgical History:   Procedure Laterality Date    COLONOSCOPY      ELBOW PROCEDURE Right 05/01/2007    golfers elbow    KNEE ARTHROSCOPY Left     TOTAL KNEE ARTHROPLASTY Left 12/07/2022    Procedure: TOTAL KNEE ARTHROPLASTY - LEFT;  Surgeon: Delmer Hi MD;  Location: [facility];  Service: Orthopedics;  Laterality: Left;  "    Family History   Problem Relation Age of Onset    Stroke Mother     Hypertension Mother     Heart attack Mother     Osteoporosis Mother     Cancer Father     Heart attack Father     Clotting disorder Father     Diabetes Brother      Social History     Tobacco Use    Smoking status: Former     Packs/day: 1.00     Years: 15.00     Additional pack years: 0.00     Total pack years: 15.00     Types: Cigarettes     Start date: 1/1/1978     Quit date: 5/15/2013     Years since quitting: 10.5    Smokeless tobacco: Never   Vaping Use    Vaping Use: Never used   Substance Use Topics    Alcohol use: Not Currently     Comment: rare social    Drug use: No   He is  with 2 children.  He has owns a brokerage company.    Review of Systems  Pertinent items are noted in HPI, all other systems reviewed and negative    Vital Signs  /98 (BP Location: Right arm, Patient Position: Lying)   Pulse 78   Temp 97.6 °F (36.4 °C) (Oral)   Resp 16   Ht 182.9 cm (72\")   Wt 90.3 kg (199 lb)   SpO2 95%   BMI 26.99 kg/m²     Physical Exam:    General Appearance:    Alert, cooperative, in no acute distress   Head:    Normocephalic, without obvious abnormality, atraumatic   Eyes:            Lids and lashes normal, conjunctivae and sclerae normal, no   icterus, no pallor, corneas clear,    Ears:    Ears appear intact with no abnormalities noted   Throat:   No oral lesions, no thrush, oral mucosa moist   Neck:   No adenopathy, supple, trachea midline, no thyromegaly    Lungs:     Clear to auscultation,respirations regular, even and unlabored    Heart:    Regular rhythm and normal rate, normal S1 and S2, no murmur, no gallop   Abdomen:     Normal bowel sounds, no masses, no organomegaly, soft non-tender, non-distended, no guarding, no rebound  tenderness   Genitalia:    Deferred   Extremities: Right knee Ace wrap CDI.  Nerve block present.   Pulses:   Pulses palpable and equal bilaterally   Skin:   No bleeding, bruising or rash "   Neurologic:   Cranial nerves 2 - 12 grossly intact. Flexion and dorsiflexion intact bilateral feet.        I reviewed the patient's new clinical results.     Results from last 7 days   Lab Units 11/15/23  0632   POTASSIUM mmol/L 4.4     Lab Results   Component Value Date    HGBA1C 5.80 (H) 11/02/2023      Latest Reference Range & Units 11/02/23 14:12   Sodium 136 - 145 mmol/L 138   Potassium 3.5 - 5.2 mmol/L 4.4   Chloride 98 - 107 mmol/L 99   CO2 22.0 - 29.0 mmol/L 31.0 (H)   Anion Gap 5.0 - 15.0 mmol/L 8.0   BUN 8 - 23 mg/dL 21   Creatinine 0.76 - 1.27 mg/dL 1.06   BUN/Creatinine Ratio 7.0 - 25.0  19.8   eGFR >60.0 mL/min/1.73 79.8   Glucose 65 - 99 mg/dL 110 (H)   Calcium 8.6 - 10.5 mg/dL 9.4   Alkaline Phosphatase 39 - 117 U/L 65   Total Protein 6.0 - 8.5 g/dL 6.8   Albumin 3.5 - 5.2 g/dL 4.5   Globulin gm/dL 2.3   A/G Ratio g/dL 2.0   AST (SGOT) 1 - 40 U/L 22   ALT (SGPT) 1 - 41 U/L 30   Total Bilirubin 0.0 - 1.2 mg/dL 0.4   (H): Data is abnormally high   Latest Reference Range & Units 11/02/23 14:10   WBC 3.40 - 10.80 10*3/mm3 7.89   RBC 4.14 - 5.80 10*6/mm3 4.74   Hemoglobin 13.0 - 17.7 g/dL 15.0   Hematocrit 37.5 - 51.0 % 44.8   Platelets 140 - 450 10*3/mm3 225   RDW 12.3 - 15.4 % 12.5   MCV 79.0 - 97.0 fL 94.5   MCH 26.6 - 33.0 pg 31.6   MCHC 31.5 - 35.7 g/dL 33.5   MPV 6.0 - 12.0 fL 9.4       Assessment and Plan:     Status post total right knee replacement    OA (osteoarthritis) of knee    HTN (hypertension)    HLD (hyperlipidemia)    ANGEL on CPAP    Elevated hemoglobin A1c    Hypothyroid      Plan  1. PT/OT- WBAT RLE  2. Pain control-prns, AC nerve block  3. IS-encourage  4. DVT proph- Mechs/Eliquis  5. Bowel regimen  6. Resume home medications as appropriate  7. Monitor post-op labs  8. DC planning for home    HTN, Hyperlipidemia  - Continue home toprol and statin  - Monitor BP   - Holding parameters for BP meds  - Labetalol PRN for SBP>170    Hypothyroid  - continue home armour     ANGEL  - CPAP at  night    Elevated hemoglobin A1c: In prediabetic range  - Explained to patient implication of A1C elevation, need to modify diet and increase activity, and f/u with PCP.      Lisa oDtson, APRN  11/15/23  14:36 EST

## 2023-11-15 NOTE — OP NOTE
Orthopaedics Operative Report    PREOPERATIVE DIAGNOSIS: Primary osteoarthritis right knee    POSTOPERATIVE DIAGNOSIS: Same    PROCEDURE PERFORMED: Right total knee arthroplasty using Awilda robot, CPT 95941, 14822    SURGEON: Delmer Hi MD    ANESTHESIA:  Choice    STAFF:  Circulator: Sania Ojeda RN  Scrub Person: Giulia Rinaldi  Vendor Representative: Jaswinder Wiley  Nursing Assistant: Susy Baig PCT  Assistant: Ada Sherman PA-C    TOURNIQUET TIME: 90 minutes    ESTIMATED BLOOD LOSS: 50 mL    COMPLICATIONS: None apparent.    RELEASES: None required after approach, bone cuts made, and osteophytes removed    Surgical Approach: Knee Medial Parapatellar     TXA: IV    IMPLANTS:     Implant Name Type Inv. Item Serial No.  Lot No. LRB No. Used Action   DEV CONTRL TISS STRATAFIX SYMM PDS PLUS CALEB CT-1 45CM - [id number] Implant DEV CONTRL TISS STRATAFIX SYMM PDS PLUS CALEB CT-1 45CM  [employer]  Right 1 Implanted   WAX BONE HEMO AESCULAP 2.5GM - [id number] Implant WAX BONE HEMO AESCULAP 2.5GM  [employer]  Right 1 Implanted   SCRW HEX PERSONA FML 2.5X25MM PK/2 - [id number] Implant SCRW HEX PERSONA FML 2.5X25MM PK/2  [employer] [id number] Right 1 Implanted   PAT NXGN POR 50A75XQ - [id number] Implant PAT NXGN POR [id number]  [employer] [id number] Right 1 Implanted   COMP FEM PERSONA CR POR COCR STD SZ9 rT - [id number] Implant COMP FEM PERSONA CR POR COCR STD SZ9 rT  [employer] [id number] Right 1 Implanted   ART/SRF KN PERSONA/VE MC GH 8TO11 10MM RT - [id number] Implant ART/SRF KN PERSONA/VE MC GH 8TO11 10MM RT  [employer] [id number] Right 1 Implanted   BASEPLT TIB/KN OSSEOTI PERSONA KEEL CMTLS 0DEG SZG RT - [id number] Implant BASEPLT TIB/KN OSSEOTI PERSONA KEEL CMTLS 0DEG SZG RT  [employer] [id number] Right 1 Implanted       Nirali Persona CR TM femur size 9 standard  size G 0 degree Braddock Ti tibia  32 TM patella button   Size 10 Vitamin E Medial  Congruent highly crosslinked polyethylene articular surface    PREOPERATIVE ANTIBIOTICS: Kefzol    REFERRING PHYSICIAN: James Baumann MD    INDICATIONS: Failure of nonoperative treatment including injections, bracing, and activity modification.    DESCRIPTION OF PROCEDURE: After informed consent was obtained, the patient was taken to the operating room. The patient was given a dose of IV antibiotics prior to incision.After the smooth induction of spinal anesthesia, the patient’s right lower extremity was prepped and draped in the usual fashion for this type of procedure. We performed a timeout to verify site and the procedure to be performed.  We began with exsanguination of the right lower extremity using an Esmarch bandage and inflation of tourniquet to 300 mmHg. We made our standard midline incision and medial parapatellar approach. We took 20% of the quadriceps tendon medially and a sleeve of tissue around the patella for repair as well a sliver of patellar tendon. Dissected extra-ostially but subperiosteally on the medial side taking off the superficial and deep MCL from the proximal tibia. These were protected throughout the procedure. Visible medial meniscus was excised. The retropatellar fat pad was excised. The ACL and visible lateral meniscus was excised as well as the synovium from the anterior surface of the distal femur.  Osteophytes were removed from the distal femur and proximal tibia at this point.       We then everted the patella and this was resurfaced, restoring patellar height. The patellar height was 24 mm preoperatively and we cut the patella to 14 mm and sized the patella to a 32.  The lughole for the press-fit implant was eventually drilled and the component slightly medialized.      We then turned our attention to placement of our pins for the femoral and tibial trackers.  The femoral trackers were placed within the incision about 2 to 3 fingerbreadths from the articular cartilage on the  medial aspect of the femur.  The tibial trackers were placed through percutaneous incisions on the tibia.  Once these were placed, we obtained our hip center and then we then registered our data points on the femur and tibia.  Once these were registered, we took the knee through a range of motion and assessed our medial and lateral gaps at 0, 45, and 90 degrees. The 90 degree gap was obtained using a Galeas elevator.  At this point, we then created our surgical plan.  Patient had a preoperative 8 degree varus deformity.  We were able to correct them to 3 degrees of varus alignment.  Preop range of motion was 3-1/2 degrees to 140 degrees.  We placed 1 degrees of varus alignment on the distal femoral cut and 0 degrees of varus on the proximal tibial cut.  We created an extension gap medially of 21 mm and 22 mm laterally.  Our flexion gap was 20 medially and 21.5 laterally.  Proximal tibial cut was made at 10 mm.    At this point the robotic arm was brought in and pinned for the distal femoral cut.  This was made and then validated.  We then sized the femur to verify the size of the femoral component to be appropriate.  We then brought the robotic arm back into pin our external rotation.  Our 4-in-1 block was then placed and we assessed for possible notching as well as for the size of the posterior condylar cuts.  The 4-in-1 cut was made without difficulty and the excess bone removed.  We then brought the robotic arm back in to make our proximal tibia cut.  Retractors were placed and the bone cut was made which was validated both for amount of bone taken off and for slope.  We then used our blocks and checked our extension and flexion gaps which were felt to be acceptable.  We then took off posterior osteophytes off the posterior medial posterior lateral femur.  We completed preparation of our tibia and femur and then trialed and a size 10 seemed to have the best stability and range of motion parameters.  The bone was  then thoroughly irrigated and felt to be appropriate for press-fit implants.  We then injected our periarticular injection into the posterior medial aspect of the knee which consisted of 20 ml of NS, 20 ml of 0.5% lidocaine with epi, 20 ml of 0.5% bupivicaine, 30 mg of toradol, and 8 mg of morphine. We implanted these press-fit implants in place and had excellent purchase.  After the implants were placed and a size 10 polyethylene trial was assessed, the range of motion was 0 to 140 degrees. We then deflated the tourniquet prior to placement of our final polyethylene spacer. Any bleeding seen in the back of the knee was controlled and we obtained hemostasis. The final spacer was then secured into place. We then used a final irrigation consisting of Irricept and diluted Betadine throughout the knee.  Trackers and pins were then removed.  We then closed our medial parapatellar approach using 0 Ethibond in an interrupted fashion.  We then closed our subcutaneous layer using running 2-0 Vicryl and interrupted 2-0 Vicryl. We closed our skin using a running 3-0 Monocryl. We placed an Exofin Fusion dressing system over the incision and took down the drapes. The patient was transferred back to their hospital bed and then taken to the recovery room in stable condition. All counts were correct postoperatively. I performed the case.      First assistant: Ada Sherman PA-C    The skilled assistance of the above noted first assistant was necessary during this complex surgical procedure.  The surgical assistant assisted with every aspect of the operation including, but not limited to, proper and safe positioning of the patient, obtaining adequate surgical exposure, manipulation of surgical instruments to make the proper bone cuts, cementing of the final implants, the continual process of hemostasis during the procedure itself in addition to surgical wound closure and removal of the patient from the operating table and  returning the patient back to the [facility].  The assistance of the surgical assistant allowed me to perform the most sensitive and technical potions of this operation using 2 hands, thus enhancing efficiency and patient safety.  This would not be possible without the help of a skilled assistant familiar with the procedure and capable of safely performing the aforementioned tasks.       POSTOPERATIVE PLAN:  1. Weight bearing as tolerated right lower extremity.  2. The patient will receive an indwelling low femoral nerve block in the recovery room.  3.  Patient will receive a dose of IV antibiotics prior to discharge home  4.  Eliquis 2.5 twice daily for 6 weeks for DVT prophylaxis.  5.  The patient will begin physical therapy with [facility] in [place] in the next 24 to 48 hours  6.  Discharge home once the patient has met criteria  7.  Patient will be discharged home with a prescription for oxycodone, Mobic or Ibuprofen, Tylenol, Colace, and Zofran in addition to their DVT prophylaxis  8.  Follow up with me in the office in 3 weeks    Delmer Hi M.D.*

## 2023-11-15 NOTE — ADDENDUM NOTE
Addendum  created 11/15/23 1056 by Abilio Torres CRNA    Clinical Note Signed, Diagnosis association updated, Intraprocedure Blocks edited

## 2023-11-15 NOTE — ANESTHESIA PROCEDURE NOTES
RIGHT Adductor canal cath      Patient reassessed immediately prior to procedure    Reason for block: at surgeon's request and post-op pain management  Performed by  CRNA/CAA: Abilio Torres CRNASRNA: Rashmi Araiza SRNA  Preanesthetic Checklist  Completed: patient identified, IV checked, site marked, risks and benefits discussed, surgical consent, monitors and equipment checked, pre-op evaluation and timeout performed  Prep:  Pt Position: supine  Sterile barriers:cap, gloves, mask, sterile barriers and washed/disinfected hands  Prep: ChloraPrep  Patient monitoring: blood pressure monitoring, continuous pulse oximetry and EKG  Procedure    Sedation: no  Performed under: spinal  Guidance:ultrasound guided    ULTRASOUND INTERPRETATION.  Using ultrasound guidance a 20 G gauge needle was placed in close proximity to the nerve, at which point, under ultrasound guidance anesthetic was injected in the area of the nerve and spread of the anesthesia was seen on ultrasound in close proximity thereto.  There were no abnormalities seen on ultrasound; a digital image was taken; and the patient tolerated the procedure with no complications. Images:still images obtained, printed/placed on chart    Laterality:right  Block Type:adductor canal block  Injection Technique:catheter  Needle Type:Tuohy and echogenic  Needle Gauge:18 G  Resistance on Injection: none  Catheter Size:20 G (20g)  Cath Depth at skin: 12 cm    Medications Used: bupivacaine PF (MARCAINE) 0.25 % injection - Injection   20 mL - 11/15/2023 10:25:00 AM      Medications  Preservative Free Saline:10ml    Post Assessment  Injection Assessment: negative aspiration for heme, incremental injection and no paresthesia on injection  Patient Tolerance:comfortable throughout block  Complications:no  Additional Notes  CATHETER   A high-frequency linear transducer, with sterile cover, was placed on the anterior mid-thigh (between the anterior superior iliac spine and patella).  "The transducer was then moved medially to identify the Sartorius muscle (Hannah), Vastus Medialis muscle (VMM), Superficial Femoral Artery (SFA) and Vein. The transducer was then moved cephalad or caudad to position the SFA in the middle of the Hannah. The insertion site was prepped and draped in sterile fashion. Skin and cutaneous tissue was infiltrated with 2-5 ml of 1% Lidocaine. Using ultrasound-guidance, an 18-gauge [employer] Ultra 360 Touhy needle was advanced in plane from lateral to medial. Preservative-free normal saline was utilized for hydro-dissection of tissue, advancement of Touhy, and to confirm needle placement below the fascial plane of the Hannah where the Nerve to the VMM is located. Local anesthetic (LA) 5 ml deposited here. The Touhy needle continues its path lateral to the SFA at the level of the Saphenous Nerve. The remainder of the LA was deposited at the 10-11 o'clock position of the SFA. This injection created a space between the Hannah and the SFA. Aspiration every 5 ml to prevent intravascular injection. Injection was completed with negative aspiration of blood and negative intravascular injection. Injection pressures were normal with minimal resistance. A 20-gauge [employer] Echo catheter was placed through the needle and advance out the tip of the Touhy 3-5 cm anterior to the SFA. The Touhy needle was then removed, and final catheter position verified at the 12 o'clock position to the SFA. The catheter was secured in the usual fashion with skin glue, benzoin, steri-strips, CHG tegaderm and label noting \"Nerve Block Catheter\". Jerk tape applied at yellow connector and catheter connection.           "

## 2023-11-15 NOTE — DISCHARGE INSTRUCTIONS
Discharge Instructions--Total Knee Replacement  Dr. Hi      Congratulations!.  You have had knee replacement surgery.  The knee joint forms where the thigh bone, shin bone, and kneecap meet.  The knee joint is supported by muscles and ligaments.  It is lined with a cushioning called cartilage.  Over time, the cartilage wears away which can make the knee feel stiff and painful.  Dr. Hi replaced your painful joint with an artificial joint to relieve the pain and restore range of motion.  Here are some directions to follow once you are at home.        **Medication/Pain management at home**    Medication schedule  A.  Expect to have pain following surgery.  Our goal is to maintain a manageable pain level.  The pain medications prescribed will provide relief but do not take away all of the pain.  The first few days after surgery can be the most painful.  Just keep in mind that it will get better.  B.  Staying on top of your pain with the combination of medications prescribed to you on a regular schedule is the best way to keep the pain from getting too severe.  You can begin weaning off the pain medication (i.e. oxycodone, Tylenol, ibuprofen) as symptoms allow.  Please make sure that your pain is controlled well enough to fully participate with physical therapy.  C.  The most effective way to take the medications is to take the Ibuprofen and Tylenol together every 8 hours.  If that does not do the trick then supplement with the oxycodone or hydrocodone.  If you are taking the oxycodone or hydrocodone, please be sure to also take the Colace (stool softener) to reduce the risk of constipation.  One of the major side effects of opioid pain medications is constipation.  Please keep this in mind.  D.  Do not wait until your pain level is at an 8 or 9 before taking medications.  At this point, you have already lost control of your pain and it will take a higher dosage to get back to a comfortable level.   Remember that the medications you are on take between 20 to 30 minutes to begin taking effect.    2.  Ice/compression/elevation  A.  Icing is helpful to reduce pain and swelling.  Icing 20 minutes at a time at least 3 times a day will be beneficial.  Do not place the ice directly on the skin itself but use a barrier such as a washcloth or a towel between the ice and your skin.  B.  Elevation works wonders for swelling.  Remember that the leg must be elevated at or above the heart for this to be effective.  C.  Remember not to place pillows directly under the knee but instead place them under your heel and ankle.  Placing pillows directly into your knee may lead to stiffness and difficulty regaining your extension.  D.  Sitting in a chair with the leg extended in front of you is not sufficient elevation and we will not make a significant difference in your swelling.  If your ankle is below your heart, your swelling may worsen.  E.  Compression is very helpful on the leg when you cannot elevate or ice.  Using an Ace wrap or compression sleeve are two good options.  Be sure to wrap the ace wrap starting from the foot and continuing up above the knee.      3.  DVT prevention  A.  Because you have had knee replacement surgery, you are at higher risk for developing blood clots.  The more active you are, the lower the risk of developing blood clots.  B.  We also reduce the risk of this rare complication from surgery by giving you medication such as aspirin, Eliquis, or Xarelto.  Please let Dr. Hi know if you have a personal history of blood clots or a first-degree relative has a history of blood clots that you did not tell him about preoperatively.  This may require a change in your blood clot prevention medication that he has prescribed.  C.  The blood clot prevention medications must be taken as scheduled.  Please let the office know if you are having trouble tolerating the medication or affording the  medication.    4.  Planning for pain medication refills   A.  Keep track of the number of pills you are taking on a daily basis and how many you have left.   B.  It takes on average 24-hours for our office to refill medications  C.  Medications will not be called in on the weekends or after hours.  More than likely the doctors that are on call for emergencies are not familiar with your case and will not call in pain medication.  D.  Be especially aware if the weekend is coming up and plan accordingly.  If you feel like you may run out of medication over the weekend, please call earlier in the week.    5.  Resuming normal home medications  A.  Unless directed otherwise by your primary care physician, Dr. Hi, or the hospitalist, you may resume all normal home medications after discharge from the hospital.  If you have questions please call the office.  B.  If you are on medications prescribed by a specialist (cardiologist, rheumatologist, etc.) contact their office about any questions or changes to those medications following surgery.      **Incision Care**    Your Exofin Fusion dressing system is designed to stay in place until your first follow-up appointment in 3 weeks.  Underneath the Exofin Fusion dressing system (looks similar to drywall tape), there are absorbable sutures which will dissolve on their own over time.  On occasion, people have a reaction to the Exofin Fusion dressing system and develop a rash.  Please call the office if this occurs.  You may shower following surgery once your nerve block has been removed.  Please keep the incision clean and dry, however.  The best way to do this is with cling wrap or a plastic bag taped on both ends.  Sit on a shower chair or stool when you shower to keep from falling.  While you may shower normally after surgery, baths unfortunately will disrupt the dressing and increase your risk of infection.  Similarly, do not submerge your operative leg in a bathtub,  swimming pool, or hot tub until you have been cleared to do so by Dr. Hi.  It usually takes about 6 weeks of healing time until these activities are allowed.  If your Exofin Fusion dressing system starts to peel off before your appointment, simply trim the edges and leave as much of it intact as possible.  After the Exofin Fusion dressing system has been removed at your 3-week postop appointment, do not put any creams, lotions, antibiotic ointments, or scar creams on your incision.  Your incision needs to be completely healed (no scabs) until these products can be used safely.  Your operative knee will be warmer to the touch than your nonsurgical knee for at least a few months.  If you notice, however, that your knee is very hot to the touch, associated with any visible drainage or marked redness, or you are having a fever or a dramatic increase in your pain not related to activity, please call the office or the surgeon on call immediately.      **Activity**    You will have 1-2 physical therapy sessions while in the hospital.  Please make sure you have a physical therapy appointment scheduled within 3 to 5 days of returning home.  If you do not or have not heard anything with regards to scheduling, please contact the office.  Typically physical therapy is scheduled during your preop visit or during your hospital visit.  Very important!  Get that knee moving!  Range of motion is the MOST important aspect of your rehab in the first 2 weeks after surgery.  Once you have reached sufficient range of motion, your physical therapist will advance your rehab and add strengthening exercises.  Your range of motion goals are 0 degrees of extension and 90 degrees of flexion at the 3-week visit.  By 6 weeks, we want 0 degrees of extension and 120 degrees of flexion.  In rare cases, if you fail to meet your range of motion goals, we may have to take you back to the operating room to perform a procedure to restore your  range of motion.  This is called a manipulation under anesthesia and not something we love doing.  This can be avoided by being vigilant about restoring your range of motion as soon as possible before scar tissue forms.  Daily movement is important to improve your comfort following surgery.  You should plan on some gentle walking and stretching 2-3 times a day in addition to your physical therapy exercises.  Recovery from a knee replacement takes time.  There will be good days where you feel hardly any pain and bad days following the surgery where you feel your pain and swelling are greater.  Know that your body is healing from a major surgery and pay attention to cues when you need to rest and take a break.  Lean on your physical therapist to help you differentiate between good pain and bad pain.  Sit in chairs with arms.  The arms make it easier for you to stand up and sit down.  Do not sit for more than 30 to 45 minutes at a time.  Nap if you are tired but do not stay in bed all day.  Do not drive until your healthcare provider says it is okay.  Most people can start driving at about 6 weeks after surgery.  Do not drive while you are taking opioid pain medication.  Remove items that may cause you to fall such as throw rugs and electrical cords.  Use nonslip bath mats, grab bars, and elevated toilet seat, and a shower chair in your bathroom.  Until your balance, flexibility, and strength improved, use a cane or walker.  Typically your physical therapist will tell you when you can wean from these devices.  Tells your dentist and all your healthcare providers that you have an artificial joint.  You may be directed to take antibiotics as prescribed for any dental work.  Dental work is not allowed until 6 weeks after surgery.          **Post surgical appointments**    Be sure to schedule your outpatient physical therapy appointments before your surgery takes place.  Once you are discharged from the hospital, expect to  attend physical therapy 2-3 times a week for the first 6 weeks.  On days when you are not attending formal therapy, you are expected to do your home exercises given to you by your therapist.    Plan to take your pain medication about 30 minutes before your physical therapy sessions.  If you are taking narcotic pain medicine prior to your therapy, please be sure to have a  to take you to and from your appointments.  Please make sure you have a post operative appointment to see Dr. Hi or his PA around 3 weeks after surgery.  This appointment is typically made by the surgery scheduler but sometimes things happen.  If you do not have an appointment, please call the office.          **Call 911**  Call 911 right away if you are experiencing chest pain or shortness of breath        **Who to contact**    Call your healthcare provider if any of the following occur:  Fever of 100 or higher  Pain or swelling in your calf  Shaking chills  Stiffness or inability to move the knee  Increased swelling in your leg  Increased redness, tenderness, or swelling in or around the knee incision  Drainage from the knee incision  Increased knee pain  Our office is open from 8 AM to 4:30 PM Monday through Thursday and 8 AM to 3 PM on Fridays.  The office number is [phone].  If you need to contact someone after hours, please call the [employer] and asked for the orthopedic surgeon on-call for Dr. Hi.  That phone number is [phone].              InfuBLOCK - Patient Information    What is a pain pump?  The InfuBLOCK pump delivers post-operative, non-narcotic, numbing medication to the nerve near the surgical site for pain relief.     Where can I find information about my pain pump?           For more information about your pain pump, scan the QR code.  For additional patient resources, visit [email or web address].                                                                                                While your physician is your primary source for information about your treatment there may be times during your treatment that you need assistance with your infusion pump.     If you need assistance take the following steps:    The InfuSystem Nursing Hotline is Here for You 24/7.  Please call [phone] for the following concerns or complications:    Answers to questions about your infusion pump                 Tubing disconnect  Assistance with pump alarms                                                      Dislodged catheter  Excessive leakage noted from pump                                         Inadequate pain control    2.   [facility] Anesthesia Acute Pain Service: [phone] is available 24/7 for any further needs or concerns about medication or pain control.     -------------------------------------------------------------------------    Nerve Catheter Removal Instructions  When your device is empty:    Remove your catheter by pulling the dressing off slowly (like you would remove a regular bandage). The catheter should pull right out of the skin.  Check that the BLUE tip is intact.                                                                                     If the catheter is stuck, reposition your   extremity and pull slowly until removed.  *If catheter is HURTING and WON'T come out, stop and call [phone] for further assistance.    Remove medication bag from the black carrying case.  Cut the tubing on right and left side of pump, and discard the medication bag and tubing into garbage.  Place the pump and black carrying case into the plastic bag and then place this into the return box.  Seal box with blue stickers and return to [employer] postal service. THIS IS PRE-PAID POSTAGE.        -------------------------------------------------------------------------    [facility] COLD THERAPY - PATIENT INSTRUCTION SHEET    Cold Compression Therapy for your comfort and  rehabilitation  Your caregivers want you to be productive in your rehab and comfortable during your stay. In keeping with those goals, you will be receiving an SMI Cold Therapy Wrap to help ease post-operative pain and swelling that might keep you from getting back on track! Your SMI Cold Therapy Wrap is effective and simple-to-use, and you will be encouraged to apply it throughout your hospital stay and at home through the duration of your recovery.    When you are ready to go home  Be sure to take your SMI Cold Therapy Wrap and both sets of Gel Bags with you for continued comfort and use throughout your rehabilitation. If you don't already have them, ask your nurse or aide to retrieve your SMI Gel Bags from the patient freezer.    Home use precautions  Always follow your medical professional's application instructions upon discharge. Your SMI Cold Therapy Wrap and Gel Bags are designed to last for months following your surgery. Never heat the Gel Bags unless specified by your healthcare provider. Supervision is advised when using this product on children or geriatric patients. To avoid danger of suffocation, please keep the outer plastic packaging away from children & pets.    Cold Therapy Instructions  Place Gel Bags in a freezer set ¾ of the way to max temperature for at least (4) hours. For best results, lay the Gel Bags flat and [id number] in the freezer. Once frozen, slide Gel Bags into the gel pouch and secure your wrap to the affected area with the straps.  Gel wraps that have been stored in a freezer for an extended period of time may require a (10) minute period of softening up in a room temperature environment before application.  The gel pouch acts as a protective barrier. NEVER place frozen bags directly onto skin, as this may cause frostbite injury.  The SMI Cold Therapy Wrap is designed to be able to be worm while ambulating. The compression straps can be secured well enough so that the Wrap won't  fall off while moving.  Wrap Application Videos can be viewed at [email or web address].  An additional protective barrier such as clothing, a washcloth, hand-towel or pillowcase may be used during prolonged treatment applications.  The Gel-Pouch and Wrap are both Latex-Free and the Gel Bag ingredients are non toxic.    [facility] Wrap care instructions  The [facility] Cold Therapy Wrap may be hand washed and hung to dry when needed.    [facility] re-order information  Additional [facility] body specific wraps and/or Gel Bags can be re-ordered from [employer] or call [employer]-WRAP ([phone])

## 2023-11-15 NOTE — THERAPY EVALUATION
Patient Name: Rojas Mcadams  : 1960    MRN: [id number]                              Today's Date: 11/15/2023       Admit Date: 11/15/2023    Visit Dx:     ICD-10-CM ICD-9-CM   1. Primary osteoarthritis of right knee  M17.11 715.16     Patient Active Problem List   Diagnosis    OA (osteoarthritis) of knee    Status post total right knee replacement    HTN (hypertension)    HLD (hyperlipidemia)    ANGEL on CPAP    Elevated hemoglobin A1c    Hypothyroid     Past Medical History:   Diagnosis Date    Arthritis     Disease of thyroid gland     Frozen shoulder 20 years    GA (granuloma annulare)     GERD (gastroesophageal reflux disease)     Hyperlipidemia     Hypertension     Knee swelling 20 years    Periarthritis of shoulder 20 years    Rotator cuff syndrome 15 years    Sleep apnea     cpap compliant    Spinal headache     Tennis elbow 15yrs    Wears eyeglasses      Past Surgical History:   Procedure Laterality Date    COLONOSCOPY      ELBOW PROCEDURE Right 2007    golfers elbow    KNEE ARTHROSCOPY Left     TOTAL KNEE ARTHROPLASTY Left 2022    Procedure: TOTAL KNEE ARTHROPLASTY - LEFT;  Surgeon: Delmer Hi MD;  Location: [facility];  Service: Orthopedics;  Laterality: Left;      General Information       Row Name 11/15/23 1412          Physical Therapy Time and Intention    Document Type evaluation  -AB     Mode of Treatment physical therapy  -AB       Row Name 11/15/23 1412          General Information    Patient Profile Reviewed yes  -AB     Prior Level of Function min assist:;all household mobility;community mobility;gait;transfer;bed mobility;ADL's  -AB     Existing Precautions/Restrictions fall;other (see comments)  adductor nerve cath, RLE WBAT  -AB     Barriers to Rehab none identified  -AB       Row Name 11/15/23 1412          Living Environment    People in Home spouse  -AB       Row Name 11/15/23 1412          Home Main Entrance    Number of Stairs, Main Entrance none  -AB        Row Name 11/15/23 1412          Stairs Within Home, Primary    Stairs, Within Home, Primary all needs met on first floor.  -AB       Row Name 11/15/23 1412          Cognition    Orientation Status (Cognition) oriented x 4  -AB       Row Name 11/15/23 1412          Safety Issues, Functional Mobility    Safety Issues Affecting Function (Mobility) awareness of need for assistance;insight into deficits/self-awareness;safety precaution awareness;safety precautions follow-through/compliance  -AB     Impairments Affecting Function (Mobility) endurance/activity tolerance;pain;strength;range of motion (ROM)  -AB               User Key  (r) = Recorded By, (t) = Taken By, (c) = Cosigned By      Initials Name Provider Type    AB Sintia Butler PT Physical Therapist                   Mobility       Row Name 11/15/23 1413          Bed Mobility    Comment, (Bed Mobility) Received and left sitting UIC  -AB       Row Name 11/15/23 1413          Transfers    Comment, (Transfers) Cues for hand placement, sequencing, and walker positioning.  -AB       Row Name 11/15/23 1413          Sit-Stand Transfer    Sit-Stand [place] (Transfers) contact guard;2 person assist;verbal cues  -AB     Assistive Device (Sit-Stand Transfers) walker, front-wheeled  -AB     Comment, (Sit-Stand Transfer) Cues to advance RLE prior to stand for comfort.  -AB       Row Name 11/15/23 1413          Gait/Stairs (Locomotion)    [place] Level (Gait) contact guard;2 person assist;verbal cues  -AB     Assistive Device (Gait) walker, front-wheeled  -AB     Patient was able to Ambulate yes  -AB     Distance in Feet (Gait) 350  -AB     Deviations/Abnormal Patterns (Gait) mikel decreased;bilateral deviations;gait speed decreased;stride length decreased  -AB     Right Sided Gait Deviations decreased knee extension;weight shift ability decreased  -AB     [place] Level (Stairs) not tested  -AB     Comment, (Gait/Stairs) Pt ambulated with step through  gait pattern and slowed mikel. Cues for upright posture, forward gaze, and increased R knee extension during stance phase. No overt LOB or knee buckling. Gait mechanics improved with cues. Further activity limited by fatigue.  -AB               User Key  (r) = Recorded By, (t) = Taken By, (c) = Cosigned By      Initials Name Provider Type    AB Sintia Butler, PT Physical Therapist                   Obj/Interventions       Row Name 11/15/23 1415          Range of Motion Comprehensive    General Range of Motion lower extremity range of motion deficits identified  -AB     Comment, General Range of Motion R knee AROM 13-85  -AB       Row Name 11/15/23 1415          Strength Comprehensive (MMT)    General Manual Muscle Testing (MMT) Assessment lower extremity strength deficits identified  -AB     Comment, General Manual Muscle Testing (MMT) Assessment Pt able to perform active SLR and DF bilaterally  -AB       Row Name 11/15/23 1415          Motor Skills    Therapeutic Exercise knee;ankle  -AB       Row Name 11/15/23 1415          Knee (Therapeutic Exercise)    Knee (Therapeutic Exercise) isometric exercises;strengthening exercise  -AB     Knee Isometrics (Therapeutic Exercise) right;quad sets;10 repetitions  -AB     Knee Strengthening (Therapeutic Exercise) right;heel slides;SLR (straight leg raise);SAQ (short arc quad);LAQ (long arc quad);3 repetitions;10 repetitions  -AB       Row Name 11/15/23 1415          Ankle (Therapeutic Exercise)    Ankle (Therapeutic Exercise) AROM (active range of motion)  -AB     Ankle AROM (Therapeutic Exercise) bilateral;dorsiflexion;plantarflexion;10 repetitions  -AB       Row Name 11/15/23 1415          Balance    Balance Assessment sitting static balance;sitting dynamic balance;standing static balance;standing dynamic balance  -AB     Static Sitting Balance standby assist  -AB     Dynamic Sitting Balance standby assist  -AB     Position, Sitting Balance unsupported;sitting in chair   -AB     Static Standing Balance contact guard  -AB     Dynamic Standing Balance contact guard;2-person assist;verbal cues  -AB     Position/Device Used, Standing Balance supported;walker, front-wheeled  -AB     Balance Interventions sitting;standing;sit to stand;supported;static;dynamic;occupation based/functional task  -AB     Comment, Balance No overt LOB or knee buckling.  -AB       Row Name 11/15/23 1415          Sensory Assessment (Somatosensory)    Sensory Assessment (Somatosensory) LE sensation intact  -AB               User Key  (r) = Recorded By, (t) = Taken By, (c) = Cosigned By      Initials Name Provider Type    AB Sintia Butler, PT Physical Therapist                   Goals/Plan       Row Name 11/15/23 1419          Bed Mobility Goal 1 (PT)    Activity/Assistive Device (Bed Mobility Goal 1, PT) sit to supine;supine to sit  -AB     [place] Level/Cues Needed (Bed Mobility Goal 1, PT) independent  -AB     Time Frame (Bed Mobility Goal 1, PT) long term goal (LTG);3 days  -AB       Row Name 11/15/23 1419          Transfer Goal 1 (PT)    Activity/Assistive Device (Transfer Goal 1, PT) sit-to-stand/stand-to-sit;bed-to-chair/chair-to-bed;walker, rolling  -AB     [place] Level/Cues Needed (Transfer Goal 1, PT) modified independence  -AB     Time Frame (Transfer Goal 1, PT) long term goal (LTG);3 days  -AB       Row Name 11/15/23 1419          Gait Training Goal 1 (PT)    Activity/Assistive Device (Gait Training Goal 1, PT) gait (walking locomotion);assistive device use;walker, rolling  -AB     [place] Level (Gait Training Goal 1, PT) modified independence  -AB     Distance (Gait Training Goal 1, PT) 400  -AB     Time Frame (Gait Training Goal 1, PT) long term goal (LTG);3 days  -AB       Row Name 11/15/23 1419          ROM Goal 1 (PT)    ROM Goal 1 (PT) R knee ROM 0-90  -AB     Time Frame (ROM Goal 1, PT) long-term goal (LTG);3 days  -AB       Row Name 11/15/23 1419          Therapy  Assessment/Plan (PT)    Planned Therapy Interventions (PT) balance training;bed mobility training;patient/family education;postural re-education;home exercise program;gait training;ROM (range of motion);strengthening;stretching;transfer training  -AB               User Key  (r) = Recorded By, (t) = Taken By, (c) = Cosigned By      Initials Name Provider Type    AB Sintia Butler, PT Physical Therapist                   Clinical Impression       Row Name 11/15/23 1416          Pain    Pretreatment Pain Rating 2/10  -AB     Posttreatment Pain Rating 2/10  -AB     Pain Location - Side/Orientation Right  -AB     Pain Location generalized  -AB     Pain Location - knee  -AB     Pre/Posttreatment Pain Comment tolerated.  -AB     Pain Intervention(s) Ambulation/increased activity;Elevated;Repositioned;Cold applied  -AB     Additional Documentation Pain Scale: Numbers Pre/Post-Treatment (Group)  -AB       Row Name 11/15/23 1416          Plan of Care Review    Plan of Care Reviewed With patient;spouse;daughter  -AB     Progress no change  -AB     Outcome Evaluation PT initial eval completed. Pt presents below his functional baseline with limited R knee ROM, decreased endurance, and acute pain. Ambulation of 350' with CGA and RW was well tolerated. No overt LOB or knee buckling. HEP completed. Further IPPT is warrented. Pt is ready for d/c home with assist and OPPT from functional mobility standpoint when medically appropriate.  -AB       Row Name 11/15/23 1416          Therapy Assessment/Plan (PT)    Rehab Potential (PT) good, to achieve stated therapy goals  -AB     Criteria for Skilled Interventions Met (PT) yes;meets criteria;skilled treatment is necessary  -AB     Therapy Frequency (PT) 2 times/day  -AB       Row Name 11/15/23 1416          Vital Signs    Pre Systolic BP Rehab 145  -AB     Pre Treatment Diastolic BP 89  -AB     Post Systolic BP Rehab 149  -AB     Post Treatment Diastolic BP 80  -AB     O2 Delivery Pre  Treatment room air  -AB     O2 Delivery Intra Treatment room air  -AB     O2 Delivery Post Treatment room air  -AB     Pre Patient Position Sitting  -AB     Intra Patient Position Standing  -AB     Post Patient Position Sitting  -AB       Row Name 11/15/23 1416          Positioning and Restraints    Pre-Treatment Position sitting in chair/recliner  -AB     Post Treatment Position chair  -AB     In Chair notified nsg;reclined;sitting;call light within reach;encouraged to call for assist;exit alarm on;with family/caregiver;legs elevated;waffle cushion  -AB               User Key  (r) = Recorded By, (t) = Taken By, (c) = Cosigned By      Initials Name Provider Type    AB Sintia Butler, PT Physical Therapist                   Outcome Measures       Row Name 11/15/23 1419          How much help from another person do you currently need...    Turning from your back to your side while in flat bed without using bedrails? 4  -AB     Moving from lying on back to sitting on the side of a flat bed without bedrails? 4  -AB     Moving to and from a bed to a chair (including a wheelchair)? 3  -AB     Standing up from a chair using your arms (e.g., wheelchair, bedside chair)? 3  -AB     Climbing 3-5 steps with a railing? 3  -AB     To walk in hospital room? 3  -AB     AM-PAC 6 Clicks Score (PT) 20  -AB     Highest Level of Mobility Goal 6 --> Walk 10 steps or more  -AB       Row Name 11/15/23 1419          PADD    Diagnosis 1  -AB     Gender 2  -AB     Age Group 2  -AB     Gait Distance 1  -AB     Assist Level 1  -AB     Home Support 3  -AB     PADD Score 10  -AB     Patient Preference home with outpatient rehab  -AB     Prediction by PADD Score directly home (with home health or out-patient rehab)  -AB       Row Name 11/15/23 1419          Functional Assessment    Outcome Measure Options AM-PAC 6 Clicks Basic Mobility (PT);PADD  -AB               User Key  (r) = Recorded By, (t) = Taken By, (c) = Cosigned By      Initials Name  Provider Type    AB Sintia Butler, PT Physical Therapist                                 Physical Therapy Education       Title: PT OT SLP Therapies (Done)       Topic: Physical Therapy (Done)       Point: Mobility training (Done)       Learning Progress Summary             Patient Acceptance, E,D, VU,DU by AB at 11/15/2023 1420                         Point: Home exercise program (Done)       Learning Progress Summary             Patient Acceptance, E,D, VU,DU by AB at 11/15/2023 1420                         Point: Body mechanics (Done)       Learning Progress Summary             Patient Acceptance, E,D, VU,DU by AB at 11/15/2023 1420                         Point: Precautions (Done)       Learning Progress Summary             Patient Acceptance, E,D, VU,DU by AB at 11/15/2023 1420                                         User Key       Initials Effective Dates Name Provider Type Discipline    AB 09/22/22 -  Sintia Butler, PT Physical Therapist PT                  PT Recommendation and Plan  Planned Therapy Interventions (PT): balance training, bed mobility training, patient/family education, postural re-education, home exercise program, gait training, ROM (range of motion), strengthening, stretching, transfer training  Plan of Care Reviewed With: patient, spouse, daughter  Progress: no change  Outcome Evaluation: PT initial eval completed. Pt presents below his functional baseline with limited R knee ROM, decreased endurance, and acute pain. Ambulation of 350' with CGA and RW was well tolerated. No overt LOB or knee buckling. HEP completed. Further IPPT is warrented. Pt is ready for d/c home with assist and OPPT from functional mobility standpoint when medically appropriate.     Time Calculation:   PT Evaluation Complexity  History, PT Evaluation Complexity: 1-2 personal factors and/or comorbidities  Examination of Body Systems (PT Eval Complexity): 1-2 elements  Clinical Presentation (PT Evaluation  Complexity): stable  Clinical Decision Making (PT Evaluation Complexity): low complexity  Overall Complexity (PT Evaluation Complexity): low complexity     PT Charges       Row Name 11/15/23 1420             Time Calculation    Start Time 1349  -AB      PT Received On 11/15/23  -AB      PT Goal Re-Cert Due Date 11/25/23  -AB         Untimed Charges    PT Eval/Re-eval Minutes 46  -AB         Total Minutes    Untimed Charges Total Minutes 46  -AB       Total Minutes 46  -AB                User Key  (r) = Recorded By, (t) = Taken By, (c) = Cosigned By      Initials Name Provider Type    AB Sintia Butler, PT Physical Therapist                  Therapy Charges for Today       Code Description Service Date Service Provider Modifiers Qty    74371347663 HC PT EVAL LOW COMPLEXITY 4 11/15/2023 Sintia Butler, PT GP 1    12587798391 HC PT THER SUPP EA 15 MIN 11/15/2023 Sintia Butler, PT GP 3            PT G-Codes  Outcome Measure Options: AM-PAC 6 Clicks Basic Mobility (PT), PADD  AM-PAC 6 Clicks Score (PT): 20  PT Discharge Summary  Anticipated Discharge Disposition (PT): home with assist, home with outpatient therapy services    Sintia Butler PT  11/15/2023

## 2023-11-15 NOTE — ANESTHESIA PROCEDURE NOTES
Spinal Block      Patient reassessed immediately prior to procedure    Patient location during procedure: OR  Indication:at surgeon's request  Performed By  CRNA/CAA: Jose Luis Boston CRNA  Preanesthetic Checklist  Completed: patient identified, IV checked, site marked, risks and benefits discussed, surgical consent, monitors and equipment checked, pre-op evaluation and timeout performed  Spinal Block Prep:  Patient Position:sitting  Sterile Tech:cap, gloves, sterile barriers and mask  Prep:Chloraprep  Patient Monitoring:blood pressure monitoring, continuous pulse oximetry and EKG    Spinal Block Procedure  Approach:midline  Guidance:landmark technique and palpation technique  Location:L4-L5  Needle Type:Ramez  Needle Gauge:25 G  Placement of Spinal needle event:cerebrospinal fluid aspirated  Paresthesia: no  Fluid Appearance:clear  Medications: bupivacaine (MARCAINE) 0.5 % injection - Injection   2 mL - 11/15/2023 7:30:00 AM   Post Assessment  Patient Tolerance:patient tolerated the procedure well with no apparent complications  Complications no  Additional Notes  Procedure:  Pt assisted to sitting position, with legs in position of comfort over side of bed.  Pt. instructed in optimal spine presentation, the spine was prepped/ Draped and the skin at insertion site was anesthetized with 1% Lidocaine 2 ml.  The spinal needle was then advanced until CSF flow was obtained and LA was injected:

## 2023-11-15 NOTE — H&P
Pre-Op H&P  Rojas Mcadams  [id number]  1960      Chief complaint: Right Knee Pain      Subjective:  Patient is a 63 y.o.male presents for scheduled surgery by Dr. Hi. He anticipates a TOTAL KNEE ARTHROPLASTY WITH CARLOS ROBOT - RIGHT - Right today.  The patient endorses having bilateral knee pain for the past 10 years.  Recently, over the past 2 to 3 weeks, he has had shooting pain up from his right knee into his right buttock.  The pain is present all the time in his right lower extremity, but worsens with movement.  He denies having any other associated symptoms with his present condition.  Ice is the only thing that has helped somewhat to temporarily alleviate his pain.    Review of Systems:  Constitutional-- No fever, chills or sweats. No fatigue.  CV-- No chest pain, palpitation or syncope. +HTN, HLD.  Resp-- No SOB, cough, hemoptysis. +ANGEL w/ CPAP use.  Skin--No rashes or lesions      Allergies:   Allergies   Allergen Reactions    Chantix [Varenicline] Hives     In combination with wellbutrin     Wellbutrin [Bupropion] Rash         Home Meds:  Medications Prior to Admission   Medication Sig Dispense Refill Last Dose    acetaminophen (TYLENOL) 325 MG tablet Take 2 tablets by mouth Every 6 (Six) Hours As Needed for Mild Pain. 60 tablet 1 11/14/2023    apixaban (ELIQUIS) 2.5 MG tablet tablet Take 1 tablet by mouth 2 (Two) Times a Day. Begin 12/8/2022 60 tablet 0 11/14/2023 at 0800    chlorhexidine (HIBICLENS) 4 % external liquid Apply  topically to the appropriate area as directed Daily. Shower w/ solution for 5 days before surgery. Bring to [facility] to be filled. 236 mL 0 11/14/2023 at 2000    cyclobenzaprine (FLEXERIL) 10 MG tablet Take 1 tablet by mouth As Needed for Muscle Spasms.   11/14/2023 at 2000    escitalopram (LEXAPRO) 10 MG tablet Take 1 tablet by mouth Daily.   11/14/2023 at 0800    fluticasone (CUTIVATE) 0.05 % cream Apply 1 application  topically to the appropriate area as directed  Daily.   11/14/2023 at 1000    ketoconazole (NIZORAL) 2 % cream Apply 1 application  topically to the appropriate area as directed As Needed.   Past Week    meloxicam (MOBIC) 7.5 MG tablet Take 1 tablet by mouth Daily. 30 tablet 1 Past Week    methylPREDNISolone (MEDROL) 4 MG dose pack Take 1 tablet by mouth Daily. Use as directed by package instructions 21 tablet 0 Past Week    metoprolol succinate XL (TOPROL-XL) 50 MG 24 hr tablet Take 1 tablet by mouth Daily.   11/14/2023 at 0800    montelukast (SINGULAIR) 10 MG tablet Take 1 tablet by mouth Every Night.   11/14/2023 at 2000    NP Thyroid 30 MG tablet Take 1 tablet by mouth Daily.   11/14/2023 at 0800    omeprazole (priLOSEC) 40 MG capsule Take 1 capsule by mouth 1 (One) Time As Needed (gerd).   Past Week    ondansetron (Zofran) 4 MG tablet Take 1 tablet by mouth Every 8 (Eight) Hours As Needed for Nausea or Vomiting. 20 tablet 1 Past Week    rosuvastatin (CRESTOR) 10 MG tablet Take 1 tablet by mouth Every Night.   11/14/2023 at 2000    diclofenac (VOLTAREN) 75 MG EC tablet 1 tablet 2 (Two) Times a Day. WILL HOLD 2 DAYS PRIOR TO SURGERY   11/12/2023 at 2000    gabapentin (NEURONTIN) 100 MG capsule TAKE ONE CAPSULE BY MOUTH THREE TIMES A DAY (Patient taking differently: Take 1 capsule by mouth.) 90 capsule 0 More than a month    lidocaine (LIDODERM) 5 % Place 1 patch on the skin as directed by provider Daily. Remove & Discard patch within 12 hours or as directed by MD 30 patch 5 11/12/2023    Testosterone Cypionate (DEPOTESTOTERONE CYPIONATE) 200 MG/ML injection Inject 1 mL into the appropriate muscle as directed by prescriber Every 14 (Fourteen) Days.   11/1/2023    traMADol (ULTRAM) 50 MG tablet Take 1 tablet by mouth Every 6 (Six) Hours As Needed for Severe Pain. 30 tablet 1 11/12/2023 at 2000         PMH:   Past Medical History:   Diagnosis Date    Arthritis     Disease of thyroid gland     Frozen shoulder 20 years    GA (granuloma annulare)     GERD  "(gastroesophageal reflux disease)     Hyperlipidemia     Hypertension     Knee swelling 20 years    Periarthritis of shoulder 20 years    Rotator cuff syndrome 15 years    Sleep apnea     cpap compliant    Spinal headache     Tennis elbow 15yrs    Wears eyeglasses      PSH:    Past Surgical History:   Procedure Laterality Date    COLONOSCOPY      ELBOW PROCEDURE Right 05/01/2007    golfers elbow    KNEE ARTHROSCOPY Left     TOTAL KNEE ARTHROPLASTY Left 12/07/2022    Procedure: TOTAL KNEE ARTHROPLASTY - LEFT;  Surgeon: Delmer Hi MD;  Location: [facility];  Service: Orthopedics;  Laterality: Left;       Immunization History:  Influenza: No  Pneumococcal: No  Tetanus: No  Covid : x4    Social History:   Tobacco:   Social History     Tobacco Use   Smoking Status Former    Packs/day: 1.00    Years: 15.00    Additional pack years: 0.00    Total pack years: 15.00    Types: Cigarettes    Start date: 1/1/1978    Quit date: 5/15/2013    Years since quitting: 10.5   Smokeless Tobacco Never      Alcohol:     Social History     Substance and Sexual Activity   Alcohol Use Not Currently    Comment: rare social         Physical Exam:/86 (BP Location: Left arm, Patient Position: Lying)   Pulse 77   Temp 97.1 °F (36.2 °C) (Tympanic)   Resp 14   Ht 182.9 cm (72\")   Wt 90.3 kg (199 lb)   SpO2 99%   BMI 26.99 kg/m²       General Appearance:    Alert, cooperative, no distress, appears stated age   Head:    Normocephalic, without obvious abnormality, atraumatic   Lungs:     Clear to auscultation bilaterally, respirations unlabored    Heart:   Regular rate and rhythm, S1 and S2 normal    Abdomen:    Soft without tenderness   Extremities:   Extremities normal, atraumatic, no cyanosis or edema   Skin:   Skin color, texture, turgor normal, no rashes or lesions   Neurologic:   Grossly intact     Results Review:     LABS:  Lab Results   Component Value Date    WBC 7.89 11/02/2023    HGB 15.0 11/02/2023    HCT 44.8 " 11/02/2023    MCV 94.5 11/02/2023     11/02/2023    NEUTROABS 5.11 11/02/2023    GLUCOSE 110 (H) 11/02/2023    BUN 21 11/02/2023    CREATININE 1.06 11/02/2023     11/02/2023    K 4.4 11/15/2023    CL 99 11/02/2023    CO2 31.0 (H) 11/02/2023    CALCIUM 9.4 11/02/2023    ALBUMIN 4.5 11/02/2023    AST 22 11/02/2023    ALT 30 11/02/2023    BILITOT 0.4 11/02/2023       RADIOLOGY:  Imaging Results (Last 72 Hours)       ** No results found for the last 72 hours. **            I reviewed the patient's new clinical results.      Impression: Osteoarthritis of right knee      Plan: TOTAL KNEE ARTHROPLASTY WITH CARLOS ANNEMARIE - RIGHT - Right       Jett Baker, APRN   11/15/2023   06:50 EST

## 2023-11-15 NOTE — ADDENDUM NOTE
Addendum  created 11/15/23 1032 by Rashmi Araiza SRNA    Clinical Note Signed, Diagnosis association updated, Intraprocedure Blocks edited, SmartForm saved

## 2023-11-15 NOTE — CASE MANAGEMENT/SOCIAL WORK
Continued Stay Note  [facility]     Patient Name: Rojas Mcadams  MRN: [id number]  Today's Date: 11/15/2023    Admit Date: 11/15/2023    Plan: home with outpt PT   Discharge Plan       Row Name 11/15/23 1410       Plan    Plan home with outpt PT    Patient/Family in Agreement with Plan yes    Plan Comments Pt lives with his wife in [place]. He is independent with ADLs and owns a rolling walker. He is followed by his PCP and has drug coverage. His plan for discharge is to return home and he has an outpt PT appt [facility] for tomorrow 11/16. No other dc needs at this time    Final Discharge Disposition Code 01 - home or self-care                   Discharge Codes    No documentation.                 Expected Discharge Date and Time       Expected Discharge Date Expected Discharge Time    Nov 15, 2023               Lexus Khanna RN

## 2023-11-15 NOTE — PLAN OF CARE
Goal Outcome Evaluation:  Plan of Care Reviewed With: patient, spouse, daughter        Progress: no change  Outcome Evaluation: PT initial eval completed. Pt presents below his functional baseline with limited R knee ROM, decreased endurance, and acute pain. Ambulation of 350' with CGA and RW was well tolerated. No overt LOB or knee buckling. HEP completed. Further IPPT is warrented. Pt is ready for d/c home with assist and OPPT from functional mobility standpoint when medically appropriate.      Anticipated Discharge Disposition (PT): home with assist, home with outpatient therapy services

## 2023-11-15 NOTE — ANESTHESIA PREPROCEDURE EVALUATION
Anesthesia Evaluation     Patient summary reviewed and Nursing notes reviewed   NPO Solid Status: > 8 hours  NPO Liquid Status: > 8 hours           Airway   Mallampati: II  TM distance: >3 FB  Neck ROM: full  No difficulty expected  Dental - normal exam     Pulmonary     breath sounds clear to auscultation  Cardiovascular   Exercise tolerance: good (4-7 METS)    Rhythm: regular  Rate: normal        Neuro/Psych  GI/Hepatic/Renal/Endo      Musculoskeletal     Abdominal    Substance History      OB/GYN          Other                          Anesthesia Plan    ASA 2     spinal     (Adductor canal block in pacu for post op pain control)          CODE STATUS:

## 2023-11-15 NOTE — ANESTHESIA POSTPROCEDURE EVALUATION
Patient: Rojas Mcadams    Procedure Summary       Date: 11/15/23 Room / Location:  [facility] /  [facility]    Anesthesia Start: 0724 Anesthesia Stop: 1012    Procedure: TOTAL KNEE ARTHROPLASTY WITH CARLOS ROBOT - RIGHT (Right: Knee) Diagnosis:       Primary osteoarthritis of right knee      (Primary osteoarthritis of right knee [M17.11])    Surgeons: Delmer Hi MD Provider: Goran Dodge MD    Anesthesia Type: spinal ASA Status: 2            Anesthesia Type: spinal    Vitals  Vitals Value Taken Time   BP     Temp     Pulse 70 11/15/23 1012   Resp     SpO2 100 % 11/15/23 1012   Vitals shown include unfiled device data.        Post Anesthesia Care and Evaluation    Patient location during evaluation: PACU  Patient participation: complete - patient participated  Level of consciousness: awake and alert  Pain management: adequate    Airway patency: patent  Anesthetic complications: No anesthetic complications  PONV Status: none  Cardiovascular status: hemodynamically stable and acceptable  Respiratory status: nonlabored ventilation, acceptable and nasal cannula  Hydration status: acceptable

## 2023-11-16 ENCOUNTER — TELEPHONE (OUTPATIENT)
Dept: ORTHOPEDIC SURGERY | Facility: CLINIC | Age: 63
End: 2023-11-16
Payer: MEDICAID

## 2023-11-16 DIAGNOSIS — Z96.651 STATUS POST TOTAL RIGHT KNEE REPLACEMENT: Primary | ICD-10-CM

## 2023-11-16 RX ORDER — CYCLOBENZAPRINE HCL 10 MG
10 TABLET ORAL 3 TIMES DAILY PRN
Qty: 40 TABLET | Refills: 1 | Status: SHIPPED | OUTPATIENT
Start: 2023-11-16

## 2023-12-01 ENCOUNTER — TELEPHONE (OUTPATIENT)
Dept: ORTHOPEDIC SURGERY | Facility: CLINIC | Age: 63
End: 2023-12-01
Payer: MEDICAID

## 2023-12-01 DIAGNOSIS — Z96.651 STATUS POST TOTAL RIGHT KNEE REPLACEMENT: ICD-10-CM

## 2023-12-01 DIAGNOSIS — Z98.890 S/P ARTHROSCOPIC KNEE SURGERY: Primary | ICD-10-CM

## 2023-12-01 RX ORDER — ACETAMINOPHEN 325 MG/1
650 TABLET ORAL EVERY 8 HOURS
Qty: 180 TABLET | Refills: 0 | Status: SHIPPED | OUTPATIENT
Start: 2023-12-01

## 2023-12-01 RX ORDER — OXYCODONE HYDROCHLORIDE 5 MG/1
5 TABLET ORAL EVERY 6 HOURS PRN
Qty: 30 TABLET | Refills: 0 | Status: SHIPPED | OUTPATIENT
Start: 2023-12-01

## 2023-12-01 RX ORDER — CYCLOBENZAPRINE HCL 10 MG
10 TABLET ORAL 3 TIMES DAILY PRN
Qty: 40 TABLET | Refills: 1 | Status: SHIPPED | OUTPATIENT
Start: 2023-12-01

## 2023-12-01 NOTE — TELEPHONE ENCOUNTER
PT CALLED AND STATED HE NEEDED A REFILL ON   acetaminophen (TYLENOL) 325 MG tablet, oxyCODONE (ROXICODONE) 5 MG immediate release tablet, cyclobenzaprine (FLEXERIL) 10 MG tablet  TO [facility] @ [address], [place] [address]

## 2023-12-07 ENCOUNTER — OFFICE VISIT (OUTPATIENT)
Dept: ORTHOPEDIC SURGERY | Facility: CLINIC | Age: 63
End: 2023-12-07
Payer: MEDICAID

## 2023-12-07 VITALS — TEMPERATURE: 98.8 F

## 2023-12-07 DIAGNOSIS — Z96.651 STATUS POST TOTAL RIGHT KNEE REPLACEMENT: Primary | ICD-10-CM

## 2023-12-07 PROCEDURE — 99024 POSTOP FOLLOW-UP VISIT: CPT | Performed by: ORTHOPAEDIC SURGERY

## 2023-12-07 RX ORDER — OXYCODONE HYDROCHLORIDE 5 MG/1
5 TABLET ORAL EVERY 6 HOURS PRN
Qty: 30 TABLET | Refills: 0 | Status: SHIPPED | OUTPATIENT
Start: 2023-12-07

## 2023-12-07 RX ORDER — TRAZODONE HYDROCHLORIDE 50 MG/1
50 TABLET ORAL NIGHTLY
Qty: 31 TABLET | Refills: 1 | Status: SHIPPED | OUTPATIENT
Start: 2023-12-07

## 2023-12-07 RX ORDER — DICLOFENAC SODIUM 75 MG/1
1 TABLET, DELAYED RELEASE ORAL 2 TIMES DAILY
COMMUNITY
Start: 2023-12-04

## 2023-12-07 NOTE — PROGRESS NOTES
[facility] [place]        Subjective     Post-op (3 weeks s/p TOTAL KNEE ARTHROPLASTY WITH CARLOS ROBOT - RIGHT (DOS 11/15/23))       HPI    Rojas Mcadams is a 63 y.o. male.  Patient returns the office today 3 weeks out from robotic right press-fit TKA on 11/15/2023.  He has a very similar set of complaints as he did when he had his left knee replaced in December 2022.  Having difficulty sleeping.  We tried Ambien and he could not tolerate it.  We tried Neurontin he does not think that he tolerated that very well either.  This eventually got better in about 2 to 2-1/2 months.  He is doing his therapy 3 times a week [facility] in [place] with Angela.          Objective      Physical Exam  Temp 98.8 °F (37.1 °C)     There is no height or weight on file to calculate BMI.        Ortho Exam        Lower Extremity:     Inspection and Palpation:      Right knee:  Calf:  Soft and non tender  Effusion:  None  Pulses:  2+  Warmth:  None  Incision: Healing appropriately    ROM:  Right:  Extension:0    Flexion:120      Deformities/Malalignments/Discrepancies:    Right:  none    Functional Testing:  Right:  Straight Leg Raise: 5/5  Patella Tracking:  Normal      Imaging Reviewed:  Imaging Results (Last 24 Hours)       Procedure Component Value Units Date/Time    XR Knee 3+ View With [place] Right [[id number]] Resulted: 12/07/23 1358     Updated: 12/07/23 1359    Narrative:      Knee X-ray    Indication: status-post TKA    Study:  AP, Lateral, and Sunrise views of Right knee    Comparison: Right knee 11/15/2023    Findings:  No signs of acute fracture are visualized  No signs of loosening are appreciated  Components are well aligned    Impression:  Status post Right total knee arthroplasty. No signs of   loosening or fracture.                Assessment    Assessment:  1. Status post total right knee replacement        Plan:  Status post right TKA--patient is doing great overall.  Range  of motion is off the charts.  Intraoperatively, we got him to 140 degrees of flexion so this should be kept in mind in terms of realistic goals for him.  We will try some trazodone to help him sleep.  See if he tolerates this better.  Reassess in 3 to 4 weeks.  X-ray needed of the right knee.  We will refill his oxycodone as well.      Delmer Hi MD  12/07/23  14:09 EST      Dictated Utilizing Dragon Dictation.

## 2024-01-15 ENCOUNTER — TELEPHONE (OUTPATIENT)
Dept: ORTHOPEDIC SURGERY | Facility: CLINIC | Age: 64
End: 2024-01-15
Payer: MEDICAID

## 2024-01-15 NOTE — TELEPHONE ENCOUNTER
formerly [facility] RESCHEDULED THE PATIENT'S 1/16 4-WEEK POST-OP APPOINTMENT. THE FIRST AVAILABLE THAT FIT THE PATIENT'S SCHEDULE WAS 2/6.

## 2024-02-05 RX ORDER — TRAZODONE HYDROCHLORIDE 50 MG/1
50 TABLET ORAL NIGHTLY
Qty: 31 TABLET | Refills: 1 | Status: SHIPPED | OUTPATIENT
Start: 2024-02-05

## 2024-02-05 NOTE — TELEPHONE ENCOUNTER
Dr. Hi-please advise Trazodone refill; Pt does have a f/u appt with you tomorrow. Thank you!    David DIETRICH CMA ([facility]), ROT

## 2024-02-06 ENCOUNTER — OFFICE VISIT (OUTPATIENT)
Dept: ORTHOPEDIC SURGERY | Facility: CLINIC | Age: 64
End: 2024-02-06
Payer: COMMERCIAL

## 2024-02-06 DIAGNOSIS — G57.01 PIRIFORMIS SYNDROME OF RIGHT SIDE: ICD-10-CM

## 2024-02-06 DIAGNOSIS — Z96.651 STATUS POST TOTAL RIGHT KNEE REPLACEMENT: Primary | ICD-10-CM

## 2024-02-06 PROCEDURE — 99024 POSTOP FOLLOW-UP VISIT: CPT | Performed by: ORTHOPAEDIC SURGERY

## 2024-02-06 RX ORDER — GABAPENTIN 100 MG/1
100 CAPSULE ORAL 3 TIMES DAILY
Qty: 90 CAPSULE | Refills: 1 | Status: SHIPPED | OUTPATIENT
Start: 2024-02-06

## 2024-02-06 RX ORDER — METHYLPREDNISOLONE 4 MG/1
TABLET ORAL
Qty: 1 TABLET | Refills: 0 | Status: SHIPPED | OUTPATIENT
Start: 2024-02-06 | End: 2024-02-08

## 2024-02-06 NOTE — PROGRESS NOTES
[facility] [place] [facility] [place]        Subjective     Post-op (2 month follow up- 12 weeks s/p TOTAL KNEE ARTHROPLASTY WITH CARLOS ROBOT - RIGHT (DOS 11/15/23)))       HPI    Rojas Mcadams is a 64 y.o. male.  Patient is here today now 3 months out from robotic right press-fit total knee arthroplasty on 11/15/2023.  Patient is doing fairly well overall from the knee standpoint.  He is having some numbness and tingling down his leg and something he feels like is consistent with piriformis syndrome.  Had a similar type course on the contralateral knee that eventually got better with time.          Objective      Physical Exam  There were no vitals taken for this visit.    There is no height or weight on file to calculate BMI.        Ortho Exam        Lower Extremity:     Inspection and Palpation:      Right knee:  Calf:  Soft and non tender  Effusion:  None  Pulses:  2+  Warmth:  None  Incision:  Healed     ROM:  Right:  Extension:0    Flexion:120      Deformities/Malalignments/Discrepancies:    Right:  none    Functional Testing:  Right:  Straight Leg Raise: 5/5  Patella Tracking:  Normal      Imaging Reviewed:  Imaging Results (Last 24 Hours)       Procedure Component Value Units Date/Time    XR Knee 3+ View With [place] Right [[id number]] Resulted: 02/06/24 1150     Updated: 02/06/24 1151    Narrative:      Knee X-ray    Indication: status-post TKA    Study:  AP, Lateral, and Sunrise views of Right knee    Comparison: Right knee 12/7/2023    Findings:  No signs of acute fracture are visualized  No signs of loosening are appreciated  Components are well aligned    Impression:  Status post Right press-fit total knee arthroplasty. No signs   of loosening or fracture.                Assessment    Assessment:  1. Status post total right knee replacement    2. Piriformis syndrome of right side        Plan:  Status post right TKA with postop piriformis syndrome--patient will be given some home exercises to work  on stretching for the piriformis syndrome.  He cannot do formal PT tells me because of his busy schedule.  He will be started on a Medrol Dosepak and a prescription for gabapentin will be prescribed to him.  He has been reminded of the need to taper on a taper off this medication.  See him back in 3 months with an x-ray or sooner if necessary.      Delmer Hi MD  02/06/24  13:07 EST      Dictated Utilizing Dragon Dictation.  Answers submitted by the patient for this visit:  Primary Reason for Visit (Submitted on 1/30/2024)  What is the primary reason for your visit?: Other  Other (Submitted on 1/30/2024)  Please describe your symptoms.: post op check up  Have you had these symptoms before?: No  How long have you been having these symptoms?: Greater than 2 weeks  Please list any medications you are currently taking for this condition.: n/a  Please describe any probable cause for these symptoms. : surgery

## 2024-02-08 ENCOUNTER — TELEPHONE (OUTPATIENT)
Dept: ORTHOPEDIC SURGERY | Facility: CLINIC | Age: 64
End: 2024-02-08
Payer: COMMERCIAL

## 2024-02-08 RX ORDER — METHYLPREDNISOLONE 4 MG/1
TABLET ORAL
Qty: 21 TABLET | Refills: 0 | Status: SHIPPED | OUTPATIENT
Start: 2024-02-08

## 2024-03-19 DIAGNOSIS — G57.01 PIRIFORMIS SYNDROME OF RIGHT SIDE: Primary | ICD-10-CM

## 2024-03-19 DIAGNOSIS — Z96.651 STATUS POST TOTAL RIGHT KNEE REPLACEMENT: ICD-10-CM

## 2024-04-09 RX ORDER — TRAZODONE HYDROCHLORIDE 50 MG/1
50 TABLET ORAL NIGHTLY
Qty: 30 TABLET | Refills: 1 | Status: SHIPPED | OUTPATIENT
Start: 2024-04-09

## 2024-04-10 DIAGNOSIS — Z96.651 STATUS POST TOTAL RIGHT KNEE REPLACEMENT: ICD-10-CM

## 2024-04-10 DIAGNOSIS — G57.01 PIRIFORMIS SYNDROME OF RIGHT SIDE: ICD-10-CM

## 2024-04-10 RX ORDER — GABAPENTIN 100 MG/1
100 CAPSULE ORAL 3 TIMES DAILY
Qty: 90 CAPSULE | Refills: 1 | Status: SHIPPED | OUTPATIENT
Start: 2024-04-10

## 2024-05-14 ENCOUNTER — OFFICE VISIT (OUTPATIENT)
Dept: ORTHOPEDIC SURGERY | Facility: CLINIC | Age: 64
End: 2024-05-14
Payer: COMMERCIAL

## 2024-05-14 VITALS
WEIGHT: 202.4 LBS | HEIGHT: 72 IN | BODY MASS INDEX: 27.41 KG/M2 | DIASTOLIC BLOOD PRESSURE: 72 MMHG | SYSTOLIC BLOOD PRESSURE: 128 MMHG

## 2024-05-14 DIAGNOSIS — Z96.652 STATUS POST TOTAL LEFT KNEE REPLACEMENT: ICD-10-CM

## 2024-05-14 DIAGNOSIS — Z96.651 STATUS POST TOTAL RIGHT KNEE REPLACEMENT: Primary | ICD-10-CM

## 2024-05-14 DIAGNOSIS — M54.31 SCIATICA OF RIGHT SIDE: ICD-10-CM

## 2024-05-14 PROCEDURE — 99214 OFFICE O/P EST MOD 30 MIN: CPT | Performed by: ORTHOPAEDIC SURGERY

## 2024-05-14 RX ORDER — OXYCODONE HYDROCHLORIDE 5 MG/1
5 TABLET ORAL EVERY 6 HOURS PRN
Qty: 15 TABLET | Refills: 0 | Status: SHIPPED | OUTPATIENT
Start: 2024-05-14

## 2024-05-14 RX ORDER — NEEDLES, FILTER 19GX1 1/2"
NEEDLE, DISPOSABLE MISCELLANEOUS
COMMUNITY
Start: 2024-02-13

## 2024-05-14 RX ORDER — TRAMADOL HYDROCHLORIDE 50 MG/1
TABLET ORAL
COMMUNITY
Start: 2024-03-14 | End: 2024-05-14

## 2024-05-14 RX ORDER — SODIUM PICOSULFATE, MAGNESIUM OXIDE, AND ANHYDROUS CITRIC ACID 12; 3.5; 1 G/175ML; G/175ML; MG/175ML
LIQUID ORAL
COMMUNITY
Start: 2024-04-20

## 2024-05-14 NOTE — PROGRESS NOTES
[facility] [place] [facility] [place]        Subjective     CC: Follow-up (3 month f/u;  s/p TOTAL KNEE ARTHROPLASTY WITH CARLOS ROBOT - RIGHT (DOS 11/15/23))      HPI    Rojas Mcadams is a 64 y.o. male.  Patient returns the office today now 6 months out from press-fit right TKA on 11/15/2023.  He says he is doing well from the knee standpoint.  At his last visit on 2/6/2024, he had a flareup of sciatica and piriformis syndrome.  He went to a chiropractor who took x-rays and told him that he needed to see him for 4 straight days.  He went to [place] shortly thereafter and had to leave the conference early because his pain was so severe.  He had a telephone visit with his PCP, Dr. Juan Baumann, and an MRI was ordered.  This was not authorized because he had not done PT.  He then went to physical therapy and this lasted for about 6 weeks.  They both felt like things were getting better and then he has now relapsed and has severe right-sided low back pain with radicular symptoms that cause numbness and tingling in his right foot and leg.  He denies bowel or bladder disturbance.    Overall, patient's symptoms are as above.    ROS:    Constiutional:Pt denies fever, chills, nausea, or vomiting.  MSK:as above        Objective      Past Medical History  Past Medical History:   Diagnosis Date    Arthritis     Disease of thyroid gland     Frozen shoulder 20 years    GA (granuloma annulare)     GERD (gastroesophageal reflux disease)     Hyperlipidemia     Hypertension     Knee swelling 20 years    Periarthritis of shoulder 20 years    Rotator cuff syndrome 15 years    Sleep apnea     cpap compliant    Spinal headache     Tennis elbow 15yrs    Wears eyeglasses      Social History     Socioeconomic History    Marital status:    Tobacco Use    Smoking status: Former     Current packs/day: 0.00     Average packs/day: 1 pack/day for 35.4 years (35.4 ttl pk-yrs)     Types: Cigarettes     Start date: 1/1/1978     Quit  "date: 5/15/2013     Years since quittin.0    Smokeless tobacco: Never   Vaping Use    Vaping status: Never Used   Substance and Sexual Activity    Alcohol use: Not Currently     Comment: rare social    Drug use: No    Sexual activity: Yes     Partners: Female     Birth control/protection: None          Physical Exam  /72   Ht 182.9 cm (72\")   Wt 91.8 kg (202 lb 6.4 oz)   BMI 27.45 kg/m²     Body mass index is 27.45 kg/m².    Patient is well nourished and well developed.        Ortho Exam        Lower Extremity:     Inspection and Palpation:      Right knee:  Calf:  Soft and non tender  Effusion:  None  Pulses:  2+  Warmth:  None  Incision:  Healed     ROM:  Right:  Extension:0    Flexion:120      Deformities/Malalignments/Discrepancies:    Right:  none    Functional Testing:  Right:  Straight Leg Raise: 5/5  Patella Tracking:  Normal      Positive straight leg raise.  Tender at the right SI joint      Imaging/Labs/EMG Reviewed and Interpreted:  Imaging Results (Last 24 Hours)       Procedure Component Value Units Date/Time    XR Knee 3+ View With [place] Right [[id number]] Resulted: 24 141     Updated: 24    Narrative:      Knee X-ray    Indication: status-post TKA    Study:  AP, Lateral, and Sunrise views of Right knee    Comparison: Right knee 2024    Findings:  No signs of acute fracture are visualized  No signs of loosening are appreciated  Components are well aligned    Impression:  Status post Right press-fit total knee arthroplasty. No signs   of loosening or fracture.                Assessment    Assessment:  1. Status post total right knee replacement    2. Status post total left knee replacement    3. Sciatica of right side        Plan:  Recommend over the counter anti-inflammatories for pain and/or swelling  Status post right TKA--radiographs look good today.  Follow-up in 6 months with an x-ray or sooner if necessary.  Continue indefinite antibiotic " prophylaxis.  Right-sided sciatica--oxycodone will be ordered for him to use at his conference in an emergency situation.  We will also order an MRI to be done hopefully next week.  We told him we will get him to the right spot based on the results of his MRI.      Delmer Hi MD  05/14/24  14:34 EDT      Dictated Utilizing Dragon Dictation.

## 2024-05-22 ENCOUNTER — HOSPITAL ENCOUNTER (OUTPATIENT)
Dept: MRI IMAGING | Facility: HOSPITAL | Age: 64
Discharge: HOME | End: 2024-05-22
Admitting: ORTHOPAEDIC SURGERY
Payer: COMMERCIAL

## 2024-05-22 DIAGNOSIS — M54.31 SCIATICA OF RIGHT SIDE: ICD-10-CM

## 2024-05-22 PROCEDURE — 72148 MRI LUMBAR SPINE W/O DYE: CPT

## 2024-05-28 ENCOUNTER — OFFICE VISIT (OUTPATIENT)
Dept: ORTHOPEDIC SURGERY | Facility: CLINIC | Age: 64
End: 2024-05-28
Payer: COMMERCIAL

## 2024-05-28 VITALS
SYSTOLIC BLOOD PRESSURE: 130 MMHG | DIASTOLIC BLOOD PRESSURE: 74 MMHG | WEIGHT: 200 LBS | BODY MASS INDEX: 26.51 KG/M2 | HEIGHT: 73 IN

## 2024-05-28 DIAGNOSIS — M43.16 SPONDYLOLISTHESIS OF LUMBAR REGION: Primary | ICD-10-CM

## 2024-05-28 DIAGNOSIS — Z96.651 STATUS POST TOTAL RIGHT KNEE REPLACEMENT: ICD-10-CM

## 2024-05-28 PROCEDURE — 99213 OFFICE O/P EST LOW 20 MIN: CPT | Performed by: ORTHOPAEDIC SURGERY

## 2024-05-28 NOTE — PROGRESS NOTES
"    [facility] [place] [facility] [place]        Subjective     CC: Follow-up (2 week MRI follow up - Sciatica of right side)      HPI    Rojas Mcadams is a 64 y.o. male.  Patient is here today for follow-up of the MRI of his lumbar spine.  He is 6 months out from press-fit right TKA in 2023.  He has done well from that surgery.  He has developed severe sciatica and some alterations in his bowel pattern.  Has never had any episodes of soiling.  Has failed a course of oral steroids, gabapentin, and Flexeril.  He is miserable with regards to his severity of pain.  Right leg is the one affected.    Overall, patient's symptoms are as above.    ROS:    Constiutional:Pt denies fever, chills, nausea, or vomiting.  MSK:as above        Objective      Past Medical History  Past Medical History:   Diagnosis Date    Arthritis     Arthritis of back 5 months    Disease of thyroid gland     Frozen shoulder 20 years    GA (granuloma annulare)     GERD (gastroesophageal reflux disease)     Hip arthrosis 5months    Hyperlipidemia     Hypertension     Knee swelling 20 years    Low back strain 5 months    Lumbosacral disc disease 5 months    Periarthritis of shoulder 20 years    Rotator cuff syndrome 15 years    Sleep apnea     cpap compliant    Spinal headache     Tennis elbow 15yrs    Wears eyeglasses      Social History     Socioeconomic History    Marital status:    Tobacco Use    Smoking status: Former     Current packs/day: 0.00     Average packs/day: 1 pack/day for 35.4 years (35.4 ttl pk-yrs)     Types: Cigarettes     Start date: 1978     Quit date: 5/15/2013     Years since quittin.0    Smokeless tobacco: Never   Vaping Use    Vaping status: Never Used   Substance and Sexual Activity    Alcohol use: Not Currently     Comment: rare social    Drug use: No    Sexual activity: Yes     Partners: Female     Birth control/protection: None          Physical Exam  /74   Ht 185.4 cm (72.99\")   Wt 90.7 " kg (200 lb)   BMI 26.39 kg/m²     Body mass index is 26.39 kg/m².    Patient is well nourished and well developed.        Ortho Exam  Positive straight leg raise  No pain with passive range of motion of the hip  Knee range of motion 0-1 20  No knee effusion    Imaging/Labs/EMG Reviewed and Interpreted:  Imaging Results (Last 24 Hours)       ** No results found for the last 24 hours. **          MRI Lumbar Spine Without Contrast  Narrative: MRI LUMBAR SPINE WO CONTRAST    Date of Exam: 5/22/2024 5:07 PM EDT    Indication: Lumbar radiculopathy, symptoms persist with > 6 wks treatment  pain.     Comparison: None available.    Technique:  Routine multiplanar/multisequence sequence images of the lumbar spine were obtained without contrast administration.      FINDINGS:  There is 4 mm anterolisthesis at L4-L5. There are Modic type II degenerative endplate marrow signal changes at L5-S1. Otherwise, marrow signal is unremarkable. Vertebral body heights are normal. Lumbar discs are somewhat desiccated. There is disc height   loss at L5-S1. The conus terminates at approximately the T12/L1 level. No abnormal signal is identified within the conus. The visualized paraspinal soft tissues are without significant abnormality. Limited visualization of the intra-abdominal structures   is unremarkable.    L1-L2: Broad-based disc bulge, bilateral facet arthropathy, and prominent posterior epidural fat. There is mild spinal canal stenosis with mild bilateral neural foraminal stenosis.    L2-L3: Broad-based disc bulge with right greater than left facet arthropathy and prominent posterior epidural fat. There is mild spinal canal stenosis with moderate right neural foraminal stenosis. Left neural foramen is not significantly narrowed.    L3-L4: Broad-based disc bulge with bilateral facet arthropathy, prominent posterior epidural fat, and ligamentum flavum buckling. There is moderate spinal canal stenosis with mild to moderate bilateral  neural foraminal stenosis.    L4-L5: 4 mm anterolisthesis with broad-based disc bulge, bilateral facet arthropathy, ligamentum flavum buckling, and prominent posterior epidural fat. There is moderate to severe spinal canal stenosis with mild to moderate bilateral neural foraminal   stenosis.    L5-S1: Posterior disc osteophyte complex and bilateral facet arthropathy contribute to moderate right and mild to moderate left neural foraminal stenosis. Spinal canal is not significantly narrowed at this level.  Impression: Lumbar spondylosis with 4 mm anterolisthesis at L4-L5 and epidural lipomatosis. There is multilevel spinal canal and neural foraminal stenosis. Spinal canal is narrowest at the L4-L5 level. Please see above for additional details.    Electronically Signed: Benigno Tang MD    5/22/2024 5:47 PM EDT    Workstation ID: [id number]       We have reviewed and interpreted the MRI of the patient's lumbar spine.  Patient has multilevel disc disease with an L4-5 spondylolisthesis.    Assessment    Assessment:  1. Spondylolisthesis of lumbar region    2. Status post total right knee replacement        Plan:  Recommend over the counter anti-inflammatories for pain and/or swelling  Status post right TKA--from this procedure, patient is actually doing quite well overall.  Follow-up in 6 months with an x-ray of both knees or sooner if necessary.  Continue indefinite antibiotic prophylaxis  Lumbar stenosis with L4-5 spondylolisthesis and severe right-sided sciatica--urgent referral to [facility] and [facility] will be placed today.       Delmer Hi MD  05/28/24  16:38 EDT      Dictated Utilizing Dragon Dictation.

## 2024-05-28 NOTE — LETTER
May 28, 2024       No Recipients    Patient: Rojas Mcadams   YOB: 1960   Date of Visit: 5/28/2024     Dear Dr. Justice Recipients:    Thank you for referring Rojas Mcadams to me for evaluation. Below are the relevant portions of my assessment and plan of care.    If you have questions, please do not hesitate to call me. I look forward to following Rojas along with you.         Sincerely,        Delmer Hi MD        CC:   No Recipients      Progress Notes:      [facility] Note        Subjective     CC: Follow-up (2 week MRI follow up - Sciatica of right side)      HPI    Rojas Mcadams is a 64 y.o. male.  Patient is here today for follow-up of the MRI of his lumbar spine.  He is 6 months out from press-fit right TKA in November 2023.  He has done well from that surgery.  He has developed severe sciatica and some alterations in his bowel pattern.  Has never had any episodes of soiling.  Has failed a course of oral steroids, gabapentin, and Flexeril.  He is miserable with regards to his severity of pain.  Right leg is the one affected.    Overall, patient's symptoms are as above.    ROS:    Constiutional:Pt denies fever, chills, nausea, or vomiting.  MSK:as above        Objective      Past Medical History  Past Medical History:   Diagnosis Date   • Arthritis    • Arthritis of back 5 months   • Disease of thyroid gland    • Frozen shoulder 20 years   • GA (granuloma annulare)    • GERD (gastroesophageal reflux disease)    • Hip arthrosis 5months   • Hyperlipidemia    • Hypertension    • Knee swelling 20 years   • Low back strain 5 months   • Lumbosacral disc disease 5 months   • Periarthritis of shoulder 20 years   • Rotator cuff syndrome 15 years   • Sleep apnea     cpap compliant   • Spinal headache    • Tennis elbow 15yrs   • Wears eyeglasses      Social History     Socioeconomic History   • Marital status:    Tobacco Use   • Smoking status: Former      "Current packs/day: 0.00     Average packs/day: 1 pack/day for 35.4 years (35.4 ttl pk-yrs)     Types: Cigarettes     Start date: 1978     Quit date: 5/15/2013     Years since quittin.0   • Smokeless tobacco: Never   Vaping Use   • Vaping status: Never Used   Substance and Sexual Activity   • Alcohol use: Not Currently     Comment: rare social   • Drug use: No   • Sexual activity: Yes     Partners: Female     Birth control/protection: None          Physical Exam  /74   Ht 185.4 cm (72.99\")   Wt 90.7 kg (200 lb)   BMI 26.39 kg/m²     Body mass index is 26.39 kg/m².    Patient is well nourished and well developed.        Ortho Exam  Positive straight leg raise  No pain with passive range of motion of the hip  Knee range of motion 0-1 20  No knee effusion    Imaging/Labs/EMG Reviewed and Interpreted:  Imaging Results (Last 24 Hours)       ** No results found for the last 24 hours. **          MRI Lumbar Spine Without Contrast  Narrative: MRI LUMBAR SPINE WO CONTRAST    Date of Exam: 2024 5:07 PM EDT    Indication: Lumbar radiculopathy, symptoms persist with > 6 wks treatment  pain.     Comparison: None available.    Technique:  Routine multiplanar/multisequence sequence images of the lumbar spine were obtained without contrast administration.      FINDINGS:  There is 4 mm anterolisthesis at L4-L5. There are Modic type II degenerative endplate marrow signal changes at L5-S1. Otherwise, marrow signal is unremarkable. Vertebral body heights are normal. Lumbar discs are somewhat desiccated. There is disc height   loss at L5-S1. The conus terminates at approximately the T12/L1 level. No abnormal signal is identified within the conus. The visualized paraspinal soft tissues are without significant abnormality. Limited visualization of the intra-abdominal structures   is unremarkable.    L1-L2: Broad-based disc bulge, bilateral facet arthropathy, and prominent posterior epidural fat. There is mild spinal " canal stenosis with mild bilateral neural foraminal stenosis.    L2-L3: Broad-based disc bulge with right greater than left facet arthropathy and prominent posterior epidural fat. There is mild spinal canal stenosis with moderate right neural foraminal stenosis. Left neural foramen is not significantly narrowed.    L3-L4: Broad-based disc bulge with bilateral facet arthropathy, prominent posterior epidural fat, and ligamentum flavum buckling. There is moderate spinal canal stenosis with mild to moderate bilateral neural foraminal stenosis.    L4-L5: 4 mm anterolisthesis with broad-based disc bulge, bilateral facet arthropathy, ligamentum flavum buckling, and prominent posterior epidural fat. There is moderate to severe spinal canal stenosis with mild to moderate bilateral neural foraminal   stenosis.    L5-S1: Posterior disc osteophyte complex and bilateral facet arthropathy contribute to moderate right and mild to moderate left neural foraminal stenosis. Spinal canal is not significantly narrowed at this level.  Impression: Lumbar spondylosis with 4 mm anterolisthesis at L4-L5 and epidural lipomatosis. There is multilevel spinal canal and neural foraminal stenosis. Spinal canal is narrowest at the L4-L5 level. Please see above for additional details.    Electronically Signed: Benigno Tang MD    5/22/2024 5:47 PM EDT    Workstation ID: [id number]       We have reviewed and interpreted the MRI of the patient's lumbar spine.  Patient has multilevel disc disease with an L4-5 spondylolisthesis.    Assessment   Assessment:  1. Spondylolisthesis of lumbar region    2. Status post total right knee replacement        Plan:  Recommend over the counter anti-inflammatories for pain and/or swelling  Status post right TKA--from this procedure, patient is actually doing quite well overall.  Follow-up in 6 months with an x-ray of both knees or sooner if necessary.  Continue indefinite antibiotic prophylaxis  Lumbar stenosis with  L4-5 spondylolisthesis and severe right-sided sciatica--urgent referral to Dr. Satya Shearer will be placed today.       Delmer Hi MD  05/28/24  16:38 EDT      Dictated Utilizing Dragon Dictation.

## 2024-06-18 ENCOUNTER — OFFICE VISIT (OUTPATIENT)
Dept: NEUROSURGERY | Facility: CLINIC | Age: 64
End: 2024-06-18
Payer: COMMERCIAL

## 2024-06-18 ENCOUNTER — HOSPITAL ENCOUNTER (OUTPATIENT)
Dept: GENERAL RADIOLOGY | Facility: HOSPITAL | Age: 64
Discharge: HOME | End: 2024-06-18
Admitting: NEUROLOGICAL SURGERY
Payer: COMMERCIAL

## 2024-06-18 VITALS — WEIGHT: 205 LBS | HEIGHT: 73 IN | TEMPERATURE: 98 F | BODY MASS INDEX: 27.17 KG/M2

## 2024-06-18 DIAGNOSIS — M43.16 SPONDYLOLISTHESIS OF LUMBAR REGION: ICD-10-CM

## 2024-06-18 DIAGNOSIS — M48.062 SPINAL STENOSIS, LUMBAR REGION, WITH NEUROGENIC CLAUDICATION: ICD-10-CM

## 2024-06-18 DIAGNOSIS — M43.16 SPONDYLOLISTHESIS OF LUMBAR REGION: Primary | ICD-10-CM

## 2024-06-18 PROCEDURE — 72120 X-RAY BEND ONLY L-S SPINE: CPT

## 2024-06-18 PROCEDURE — 99203 OFFICE O/P NEW LOW 30 MIN: CPT | Performed by: NEUROLOGICAL SURGERY

## 2024-06-18 RX ORDER — TRAMADOL HYDROCHLORIDE 50 MG/1
50 TABLET ORAL
COMMUNITY
Start: 2024-06-06

## 2024-06-18 NOTE — PROGRESS NOTES
Patient: Rojas Mcadams  : 1960    Primary Care Provider: James Baumann MD    Requesting Provider: As above        History    Chief Complaint: Low back and right lower extremity pain.    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  In November and then in December he underwent knee surgery by Dr. Hi.  He did great with that.  As such, he returned to the gym and was trying to be more active.  Imaging anywhere he began experiencing increased low back pain that is extended into an approximate L5 distribution of his right lower extremity.  Symptoms are worse with virtually any activity.  They improved with lying flat or sitting.  He is also done chiropractic.  He has done 6-8 weeks of physical therapy to no avail.  He has no left leg symptoms.  He has chronically had some constipation but that has been worse.  He denies any bladder difficulty.  He has no perineal sensory change.  His hip and leg symptoms bother him much more than his back.    Review of Systems   Constitutional:  Negative for activity change, appetite change, chills, diaphoresis, fatigue, fever and unexpected weight change.   HENT:  Negative for congestion, dental problem, drooling, ear discharge, ear pain, facial swelling, hearing loss, mouth sores, nosebleeds, postnasal drip, rhinorrhea, sinus pressure, sinus pain, sneezing, sore throat, tinnitus, trouble swallowing and voice change.    Eyes:  Negative for photophobia, pain, discharge, redness, itching and visual disturbance.   Respiratory:  Negative for apnea, cough, choking, chest tightness, shortness of breath, wheezing and stridor.    Cardiovascular:  Negative for chest pain, palpitations and leg swelling.   Gastrointestinal:  Negative for abdominal distention, abdominal pain, anal bleeding, blood in stool, constipation, diarrhea, nausea, rectal pain and vomiting.   Endocrine: Negative for  "cold intolerance, heat intolerance, polydipsia, polyphagia and polyuria.   Genitourinary:  Negative for decreased urine volume, difficulty urinating, dysuria, enuresis, flank pain, frequency, genital sores, hematuria, penile discharge, penile pain, penile swelling, scrotal swelling, testicular pain and urgency.   Musculoskeletal:  Positive for back pain and gait problem. Negative for arthralgias, joint swelling, myalgias, neck pain and neck stiffness.   Skin:  Negative for color change, pallor, rash and wound.   Allergic/Immunologic: Negative for environmental allergies, food allergies and immunocompromised state.   Neurological:  Negative for dizziness, tremors, seizures, syncope, facial asymmetry, speech difficulty, weakness, light-headedness, numbness and headaches.   Hematological:  Negative for adenopathy. Does not bruise/bleed easily.   Psychiatric/Behavioral:  Negative for agitation, behavioral problems, confusion, decreased concentration, dysphoric mood, hallucinations, self-injury, sleep disturbance and suicidal ideas. The patient is not nervous/anxious and is not hyperactive.      The patient's past medical history, past surgical history, family history, and social history have been reviewed at length in the electronic medical record.      Physical Exam:   Ht 185.4 cm (73\")   Wt 93 kg (205 lb)   BMI 27.05 kg/m²   CONSTITUTIONAL: Patient is well-nourished, pleasant and appears stated age.  MUSCULOSKELETAL:  Straight leg raising is negative.  Rodolfo's Sign is negative.  ROM in the low back is normal.  Tenderness in the back to palpation is not observed.  NEUROLOGICAL:  Orientation, memory, attention span, language function, and cognition have been examined and are intact.  Strength is intact in the lower extremities to direct testing.  Muscle tone is normal throughout.  Station and gait are normal.  Sensation is intact to light touch testing throughout except in the top of the right foot where it is " diminished.  Deep tendon reflexes are difficult to elicit throughout.  Coordination is intact.      Medical Decision Making    Data Review:   (All imaging studies were personally reviewed unless stated otherwise)  MRI of the lumbar spine dated 5/22/2024 demonstrates diffuse degenerative disc disease and facet arthropathy.  There is a low-grade listhesis of L4 and L5.  There is a moderate amount of epidural lipomatosis.  The stenosis at L4-5 is pronounced.  There is moderate canal narrowing at L3-4.    Diagnosis:   1.  Lumbar radiculopathy.  2.  Lumbar stenosis with neurogenic claudication.  3.  L4-5 spondylolisthesis.    Treatment Options:   I have referred the patient for flexion-extension upright lateral lumbar spine x-rays to assess for instability.  He is not interested in medication or pain management modalities.  He will require lumbar decompression.  If there is evidence of instability then he might require additional fusion and stabilization.  We will discuss a specific plan at follow-up.      Scribed for Tj Chandler MD by Gaye Rivera MA on 6/18/2024 12:48 EDT      I, Dr. Chandler, personally performed the services described in the documentation, as scribed in my presence, and it is both accurate and complete.

## 2024-06-26 ENCOUNTER — OFFICE VISIT (OUTPATIENT)
Dept: NEUROSURGERY | Facility: CLINIC | Age: 64
End: 2024-06-26
Payer: COMMERCIAL

## 2024-06-26 ENCOUNTER — PREP FOR SURGERY (OUTPATIENT)
Dept: OTHER | Facility: HOSPITAL | Age: 64
End: 2024-06-26
Payer: COMMERCIAL

## 2024-06-26 VITALS — BODY MASS INDEX: 27.17 KG/M2 | HEIGHT: 73 IN | WEIGHT: 205 LBS | TEMPERATURE: 97.7 F

## 2024-06-26 DIAGNOSIS — M48.062 SPINAL STENOSIS, LUMBAR REGION, WITH NEUROGENIC CLAUDICATION: ICD-10-CM

## 2024-06-26 DIAGNOSIS — M43.16 SPONDYLOLISTHESIS OF LUMBAR REGION: ICD-10-CM

## 2024-06-26 DIAGNOSIS — M48.061 STENOSIS OF LATERAL RECESS OF LUMBAR SPINE: Primary | ICD-10-CM

## 2024-06-26 DIAGNOSIS — M54.16 LUMBAR RADICULOPATHY: Primary | ICD-10-CM

## 2024-06-26 PROBLEM — M51.26 LUMBAR DISC HERNIATION: Status: ACTIVE | Noted: 2024-06-26

## 2024-06-26 PROCEDURE — 99213 OFFICE O/P EST LOW 20 MIN: CPT | Performed by: NEUROLOGICAL SURGERY

## 2024-06-26 RX ORDER — CHLORHEXIDINE GLUCONATE 40 MG/ML
SOLUTION TOPICAL
Qty: 120 ML | Refills: 0 | Status: SHIPPED | OUTPATIENT
Start: 2024-06-26

## 2024-06-26 RX ORDER — FAMOTIDINE 20 MG/1
20 TABLET, FILM COATED ORAL
OUTPATIENT
Start: 2024-06-26

## 2024-06-26 NOTE — PROGRESS NOTES
Patient: Rojas Mcadams  : 1960    Primary Care Provider: James Baumann MD    Requesting Provider: As above        History    Chief Complaint: Low back and right lower EXTR pain.    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  In November and then in December he underwent knee surgery by Dr. Hi.  He did great with that.  As such, he returned to the gym and was trying to be more active.  He subsequently began experiencing increased low back pain that has extended into an approximate L5 distribution of his right lower extremity.  Symptoms are worse with virtually any activity.  They improved with lying flat or sitting.  He has also done chiropractic.  He has done 6-8 weeks of physical therapy to no avail.  He has no left leg symptoms.  He has chronically had some constipation but that has been worse.  He denies any bladder difficulty.  He has no perineal sensory change.  His hip and leg symptoms bother him much more than his back.  At this point he is really not having a lot of back difficulty and most of it is his right leg.       Review of Systems   Constitutional:  Negative for activity change, appetite change, chills, diaphoresis, fatigue, fever and unexpected weight change.   HENT:  Negative for congestion, dental problem, drooling, ear discharge, ear pain, facial swelling, hearing loss, mouth sores, nosebleeds, postnasal drip, rhinorrhea, sinus pressure, sinus pain, sneezing, sore throat, tinnitus, trouble swallowing and voice change.    Eyes:  Negative for photophobia, pain, discharge, redness, itching and visual disturbance.   Respiratory:  Negative for apnea, cough, choking, chest tightness, shortness of breath, wheezing and stridor.    Cardiovascular:  Negative for chest pain, palpitations and leg swelling.   Gastrointestinal:  Negative for abdominal distention, abdominal pain, anal bleeding, blood in  "stool, constipation, diarrhea, nausea, rectal pain and vomiting.   Endocrine: Negative for cold intolerance, heat intolerance, polydipsia, polyphagia and polyuria.   Genitourinary:  Negative for decreased urine volume, difficulty urinating, dysuria, enuresis, flank pain, frequency, genital sores, hematuria, penile discharge, penile pain, penile swelling, scrotal swelling, testicular pain and urgency.   Musculoskeletal:  Positive for back pain and gait problem. Negative for arthralgias, joint swelling, myalgias, neck pain and neck stiffness.   Skin:  Negative for color change, pallor, rash and wound.   Allergic/Immunologic: Negative for environmental allergies, food allergies and immunocompromised state.   Neurological:  Negative for dizziness, tremors, seizures, syncope, facial asymmetry, speech difficulty, weakness, light-headedness, numbness and headaches.   Hematological:  Negative for adenopathy. Does not bruise/bleed easily.   Psychiatric/Behavioral:  Negative for agitation, behavioral problems, confusion, decreased concentration, dysphoric mood, hallucinations, self-injury, sleep disturbance and suicidal ideas. The patient is not nervous/anxious and is not hyperactive.        The patient's past medical history, past surgical history, family history, and social history have been reviewed at length in the electronic medical record.      Physical Exam:   Temp 97.7 °F (36.5 °C) (Infrared)   Ht 185.4 cm (73\")   Wt 93 kg (205 lb)   BMI 27.05 kg/m²   Deferred    Medical Decision Making    Data Review:   (All imaging studies were personally reviewed unless stated otherwise)  MRI of the lumbar spine dated 5/22/2024 demonstrates diffuse degenerative disc disease and facet arthropathy. There is a low-grade listhesis of L4 and L5. There is a moderate amount of epidural lipomatosis. The stenosis at L4-5 is pronounced. There is moderate canal narrowing at L3-4.     Flexion-extension plain films may demonstrate a " millimeter or 2 of movement from flexion to extension.  The offset on the upright images may be slightly worse than on the recumbent MRI.    Diagnosis:   1.  Lumbar radiculopathy.  2.  Lumbar stenosis without significant central neurogenic claudication.  3.  L4-5 spondylolisthesis with very modest instability.    Treatment Options:   The patient's primary issues are that of his right leg pain.  I have recommended right L4-5 foraminotomy and possible discectomy.  The nature of the procedure as well as the potential risks, complications, limitations, and alternatives to the procedure were discussed at length with the patient and the patient has agreed to proceed with surgery.  It is possible that this may not correct all of his current difficulties.  It is possible that he may go on to develop more difficulties necessitating additional decompression and possibly even fusion and stabilization from L3-5.      Scribed for Tj Chandler MD by Yina Kim CMA on 6/26/2024 16:33 EDT       I, Dr. Chandler, personally performed the services described in the documentation, as scribed in my presence, and it is both accurate and complete.

## 2024-06-26 NOTE — H&P
Patient: Rojas Mcadams  : 1960     Primary Care Provider: James Baumann MD     Requesting Provider: As above           History     Chief Complaint: Low back and right lower EXTR pain.     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  In November and then in December he underwent knee surgery by Dr. Hi.  He did great with that.  As such, he returned to the gym and was trying to be more active.  He subsequently began experiencing increased low back pain that has extended into an approximate L5 distribution of his right lower extremity.  Symptoms are worse with virtually any activity.  They improved with lying flat or sitting.  He has also done chiropractic.  He has done 6-8 weeks of physical therapy to no avail.  He has no left leg symptoms.  He has chronically had some constipation but that has been worse.  He denies any bladder difficulty.  He has no perineal sensory change.  His hip and leg symptoms bother him much more than his back.  At this point he is really not having a lot of back difficulty and most of it is his right leg.        Review of Systems   Constitutional:  Negative for activity change, appetite change, chills, diaphoresis, fatigue, fever and unexpected weight change.   HENT:  Negative for congestion, dental problem, drooling, ear discharge, ear pain, facial swelling, hearing loss, mouth sores, nosebleeds, postnasal drip, rhinorrhea, sinus pressure, sinus pain, sneezing, sore throat, tinnitus, trouble swallowing and voice change.    Eyes:  Negative for photophobia, pain, discharge, redness, itching and visual disturbance.   Respiratory:  Negative for apnea, cough, choking, chest tightness, shortness of breath, wheezing and stridor.    Cardiovascular:  Negative for chest pain, palpitations and leg swelling.   Gastrointestinal:  Negative for abdominal distention, abdominal pain, anal bleeding,  blood in stool, constipation, diarrhea, nausea, rectal pain and vomiting.   Endocrine: Negative for cold intolerance, heat intolerance, polydipsia, polyphagia and polyuria.   Genitourinary:  Negative for decreased urine volume, difficulty urinating, dysuria, enuresis, flank pain, frequency, genital sores, hematuria, penile discharge, penile pain, penile swelling, scrotal swelling, testicular pain and urgency.   Musculoskeletal:  Positive for back pain and gait problem. Negative for arthralgias, joint swelling, myalgias, neck pain and neck stiffness.   Skin:  Negative for color change, pallor, rash and wound.   Allergic/Immunologic: Negative for environmental allergies, food allergies and immunocompromised state.   Neurological:  Negative for dizziness, tremors, seizures, syncope, facial asymmetry, speech difficulty, weakness, light-headedness, numbness and headaches.   Hematological:  Negative for adenopathy. Does not bruise/bleed easily.   Psychiatric/Behavioral:  Negative for agitation, behavioral problems, confusion, decreased concentration, dysphoric mood, hallucinations, self-injury, sleep disturbance and suicidal ideas. The patient is not nervous/anxious and is not hyperactive.          The patient's past medical history, past surgical history, family history, and social history have been reviewed at length in the electronic medical record.     Past Medical History:   Diagnosis Date    Arthritis     Arthritis of back 5 months    Coronary artery disease     Disease of thyroid gland     Frozen shoulder 20 years    GA (granuloma annulare)     GERD (gastroesophageal reflux disease)     Hip arthrosis 5months    Hyperlipidemia     Hypertension     Knee swelling 20 years    Low back pain Jan 15th 2024    Sciatica RSide down to feet    Low back strain 5 months    Lumbosacral disc disease 5 months    Periarthritis of shoulder 20 years    Rotator cuff syndrome 15 years    Sleep apnea     cpap compliant    Spinal  headache     Tennis elbow 15yrs    Wears eyeglasses      Past Surgical History:   Procedure Laterality Date    COLONOSCOPY      ELBOW PROCEDURE Right 2007    golfers elbow    KNEE ARTHROSCOPY Left     TOTAL KNEE ARTHROPLASTY Left 2022    Procedure: TOTAL KNEE ARTHROPLASTY - LEFT;  Surgeon: Delmer Hi MD;  Location:  [facility];  Service: Orthopedics;  Laterality: Left;    TOTAL KNEE ARTHROPLASTY Right 11/15/2023    Procedure: TOTAL KNEE ARTHROPLASTY WITH CARLOS ROBOT - RIGHT;  Surgeon: Delmer Hi MD;  Location:  [facility];  Service: Robotics - Ortho;  Laterality: Right;     Family History   Problem Relation Age of Onset    Stroke Mother     Hypertension Mother     Heart attack Mother     Osteoporosis Mother     Cancer Father     Heart attack Father     Clotting disorder Father     Heart disease Father     Diabetes Brother     Early death Brother         Diabetes    Kidney disease Brother         two transplants    Vision loss Brother     Scoliosis Paternal Aunt     Alcohol abuse Brother     Early death Brother         Alcohol    Anxiety disorder Daughter      Social History     Socioeconomic History    Marital status:    Tobacco Use    Smoking status: Former     Current packs/day: 0.00     Average packs/day: 1 pack/day for 35.4 years (35.4 ttl pk-yrs)     Types: Cigarettes     Start date: 1978     Quit date: 5/15/2013     Years since quittin.1     Passive exposure: Past    Smokeless tobacco: Never   Vaping Use    Vaping status: Never Used   Substance and Sexual Activity    Alcohol use: Not Currently     Comment: rare social    Drug use: No    Sexual activity: Yes     Partners: Female     Birth control/protection: None           Allergies   Allergen Reactions    Chantix [Varenicline] Hives     In combination with wellbutrin     Wellbutrin [Bupropion] Rash       Current Outpatient Medications on File Prior to Visit   Medication Sig Dispense Refill    BD Integra  "Syringe 25G X 1\" 3 ML misc       cyclobenzaprine (FLEXERIL) 10 MG tablet Take 1 tablet by mouth 3 (Three) Times a Day As Needed for Muscle Spasms. 40 tablet 1    diclofenac (VOLTAREN) 75 MG EC tablet Take 1 tablet by mouth 2 (Two) Times a Day.      escitalopram (LEXAPRO) 10 MG tablet Take 1 tablet by mouth Daily.      fluticasone (CUTIVATE) 0.05 % cream Apply 1 Application topically to the appropriate area as directed Daily.      gabapentin (NEURONTIN) 100 MG capsule TAKE ONE CAPSULE BY MOUTH THREE TIMES A DAY 90 capsule 1    ketoconazole (NIZORAL) 2 % cream Apply 1 Application topically to the appropriate area as directed As Needed.      meloxicam (MOBIC) 7.5 MG tablet Take 1 tablet by mouth Daily. 30 tablet 0    metoprolol succinate XL (TOPROL-XL) 50 MG 24 hr tablet Take 1 tablet by mouth Daily.      montelukast (SINGULAIR) 10 MG tablet Take 1 tablet by mouth Every Night.      NP Thyroid 30 MG tablet Take 1 tablet by mouth Daily.      omeprazole (priLOSEC) 40 MG capsule Take 1 capsule by mouth 1 (One) Time As Needed (gerd).      rosuvastatin (CRESTOR) 10 MG tablet Take 1 tablet by mouth Every Night.      Testosterone Cypionate (DEPOTESTOTERONE CYPIONATE) 200 MG/ML injection Inject 1 mL into the appropriate muscle as directed by prescriber Every 14 (Fourteen) Days.      traMADol (ULTRAM) 50 MG tablet Take 1 tablet by mouth.      traZODone (DESYREL) 50 MG tablet TAKE ONE TABLET BY MOUTH ONCE NIGHTLY 30 tablet 1     No current facility-administered medications on file prior to visit.          Physical Exam:   Temp 97.7 °F (36.5 °C) (Infrared)   Ht 185.4 cm (73\")   Wt 93 kg (205 lb)   BMI 27.05 kg/m²   Deferred     Medical Decision Making     Data Review:   (All imaging studies were personally reviewed unless stated otherwise)  MRI of the lumbar spine dated 5/22/2024 demonstrates diffuse degenerative disc disease and facet arthropathy. There is a low-grade listhesis of L4 and L5. There is a moderate amount of " epidural lipomatosis. The stenosis at L4-5 is pronounced. There is moderate canal narrowing at L3-4.      Flexion-extension plain films may demonstrate a millimeter or 2 of movement from flexion to extension.  The offset on the upright images may be slightly worse than on the recumbent MRI.     Diagnosis:   1.  Lumbar radiculopathy.  2.  Lumbar stenosis without significant central neurogenic claudication.  3.  L4-5 spondylolisthesis with very modest instability.     Treatment Options:   The patient's primary issues are that of his right leg pain.  I have recommended right L4-5 foraminotomy and possible discectomy.  The nature of the procedure as well as the potential risks, complications, limitations, and alternatives to the procedure were discussed at length with the patient and the patient has agreed to proceed with surgery.  It is possible that this may not correct all of his current difficulties.  It is possible that he may go on to develop more difficulties necessitating additional decompression and possibly even fusion and stabilization from L3-5.

## 2024-06-27 PROBLEM — M48.061 STENOSIS OF LATERAL RECESS OF LUMBAR SPINE: Status: ACTIVE | Noted: 2024-06-26

## 2024-07-01 ENCOUNTER — PRE-ADMISSION TESTING (OUTPATIENT)
Dept: PREADMISSION TESTING | Facility: HOSPITAL | Age: 64
End: 2024-07-01
Payer: COMMERCIAL

## 2024-07-01 VITALS — WEIGHT: 207.45 LBS | HEIGHT: 72 IN | BODY MASS INDEX: 28.1 KG/M2

## 2024-07-01 DIAGNOSIS — M48.061 STENOSIS OF LATERAL RECESS OF LUMBAR SPINE: ICD-10-CM

## 2024-07-01 LAB
DEPRECATED RDW RBC AUTO: 42.1 FL (ref 37–54)
ERYTHROCYTE [DISTWIDTH] IN BLOOD BY AUTOMATED COUNT: 12.9 % (ref 12.3–15.4)
HCT VFR BLD AUTO: 44 % (ref 37.5–51)
HGB BLD-MCNC: 14.8 G/DL (ref 13–17.7)
MCH RBC QN AUTO: 30.2 PG (ref 26.6–33)
MCHC RBC AUTO-ENTMCNC: 33.6 G/DL (ref 31.5–35.7)
MCV RBC AUTO: 89.8 FL (ref 79–97)
PLATELET # BLD AUTO: 231 10*3/MM3 (ref 140–450)
PMV BLD AUTO: 9.1 FL (ref 6–12)
POTASSIUM SERPL-SCNC: 4.3 MMOL/L (ref 3.5–5.2)
RBC # BLD AUTO: 4.9 10*6/MM3 (ref 4.14–5.8)
WBC NRBC COR # BLD AUTO: 8.02 10*3/MM3 (ref 3.4–10.8)

## 2024-07-01 PROCEDURE — 87081 CULTURE SCREEN ONLY: CPT

## 2024-07-01 PROCEDURE — 85027 COMPLETE CBC AUTOMATED: CPT

## 2024-07-01 PROCEDURE — 84132 ASSAY OF SERUM POTASSIUM: CPT

## 2024-07-01 PROCEDURE — 93005 ELECTROCARDIOGRAM TRACING: CPT

## 2024-07-01 PROCEDURE — 36415 COLL VENOUS BLD VENIPUNCTURE: CPT

## 2024-07-01 NOTE — PAT
An arrival time for procedure was not provided during PAT visit. If patient had any questions or concerns about their arrival time, they were instructed to contact their surgeon/physician.  Additionally, if the patient referred to an arrival time that was acquired from their my chart account, patient was encouraged to verify that time with their surgeon/physician. Arrival times are NOT provided in Pre Admission Testing Department.      Patient viewed general PAT education video as instructed in their preoperative information received from their surgeon.  Patient stated the general PAT education video was viewed in its entirety and survey completed.  Copies of PAT general education handouts (Incentive Spirometry, Meds to Beds Program, Patient Belongings, Pre-op skin preparation instructions, Blood Glucose testing, Visitor policy, Surgery FAQ, Code H) distributed to patient if not printed. Education related to the PAT pass and skin preparation for surgery (if applicable) completed in PAT as a reinforcement to PAT education video. Patient instructed to return PAT pass provided today as well as completed skin preparation sheet (if applicable) on the day of procedure.     Additionally if patient had not viewed video yet but intended to view it at home or in our waiting area, then referred them to the handout with QR code/link provided during PAT visit.  Instructed patient to complete survey after viewing the video in its entirety.  Encouraged patient/family to read PAT general education handouts thoroughly and notify PAT staff with any questions or concerns. Patient verbalized understanding of all information and priority content.    Bactroban (if prescribed) and Chlorhexidine Prescription prescribed by physician before PAT visit.  Verified with patient that medication(s) were picked up from their pharmacy.  Written instructions given to patient during PAT visit.  Patient/family also instructed to complete skin prep  checklist and return the checklist on the day of surgery to preoperative staff.  Patient/family verbalized understanding.    Patient to apply Chlorhexadine wipes  to surgical area (as instructed) the night before procedure and the AM of procedure. Wipes provided.    Patient denies any current skin issues.     Patient instructed to drink 20 ounces of Gatorade or Gatorlyte (if diabetic) and it needs to be completed 1 hour (for [facility] OR patients) or 2 hours (scheduled  section & [facility] patients) before given arrival time for procedure (NO RED Gatorade and NO Gatorade Zero).    Patient verbalized understanding.    EKG IN CHART

## 2024-07-03 LAB — MRSA SPEC QL CULT: NORMAL

## 2024-07-04 LAB
QT INTERVAL: 390 MS
QTC INTERVAL: 412 MS

## 2024-07-10 ENCOUNTER — ANESTHESIA EVENT (OUTPATIENT)
Dept: PERIOP | Facility: HOSPITAL | Age: 64
End: 2024-07-10
Payer: COMMERCIAL

## 2024-07-10 RX ORDER — FAMOTIDINE 10 MG/ML
20 INJECTION, SOLUTION INTRAVENOUS ONCE
Status: CANCELLED | OUTPATIENT
Start: 2024-07-10 | End: 2024-07-10

## 2024-07-11 ENCOUNTER — APPOINTMENT (OUTPATIENT)
Dept: GENERAL RADIOLOGY | Facility: HOSPITAL | Age: 64
End: 2024-07-11
Payer: COMMERCIAL

## 2024-07-11 ENCOUNTER — HOSPITAL ENCOUNTER (OUTPATIENT)
Facility: HOSPITAL | Age: 64
Discharge: HOME | End: 2024-07-11
Attending: NEUROLOGICAL SURGERY | Admitting: NEUROLOGICAL SURGERY
Payer: COMMERCIAL

## 2024-07-11 ENCOUNTER — ANESTHESIA (OUTPATIENT)
Dept: PERIOP | Facility: HOSPITAL | Age: 64
End: 2024-07-11
Payer: COMMERCIAL

## 2024-07-11 VITALS
OXYGEN SATURATION: 99 % | TEMPERATURE: 97 F | DIASTOLIC BLOOD PRESSURE: 94 MMHG | RESPIRATION RATE: 18 BRPM | SYSTOLIC BLOOD PRESSURE: 146 MMHG | HEART RATE: 79 BPM

## 2024-07-11 DIAGNOSIS — M48.061 STENOSIS OF LATERAL RECESS OF LUMBAR SPINE: ICD-10-CM

## 2024-07-11 PROCEDURE — 25010000002 HYDROMORPHONE 1 MG/ML SOLUTION

## 2024-07-11 PROCEDURE — 25010000002 CEFAZOLIN PER 500 MG: Performed by: NEUROLOGICAL SURGERY

## 2024-07-11 PROCEDURE — 25010000002 SUGAMMADEX 200 MG/2ML SOLUTION: Performed by: NURSE ANESTHETIST, CERTIFIED REGISTERED

## 2024-07-11 PROCEDURE — 25810000003 LACTATED RINGERS PER 1000 ML: Performed by: STUDENT IN AN ORGANIZED HEALTH CARE EDUCATION/TRAINING PROGRAM

## 2024-07-11 PROCEDURE — 25010000002 FENTANYL CITRATE (PF) 50 MCG/ML SOLUTION

## 2024-07-11 PROCEDURE — 25010000002 FENTANYL CITRATE (PF) 100 MCG/2ML SOLUTION: Performed by: NURSE ANESTHETIST, CERTIFIED REGISTERED

## 2024-07-11 PROCEDURE — 25010000002 NALOXONE PER 1 MG: Performed by: NURSE ANESTHETIST, CERTIFIED REGISTERED

## 2024-07-11 PROCEDURE — 25010000002 ONDANSETRON PER 1 MG: Performed by: NURSE ANESTHETIST, CERTIFIED REGISTERED

## 2024-07-11 PROCEDURE — C1713 ANCHOR/SCREW BN/BN,TIS/BN: HCPCS | Performed by: NEUROLOGICAL SURGERY

## 2024-07-11 PROCEDURE — 63047 LAM FACETEC & FORAMOT LUMBAR: CPT

## 2024-07-11 PROCEDURE — 63047 LAM FACETEC & FORAMOT LUMBAR: CPT | Performed by: NEUROLOGICAL SURGERY

## 2024-07-11 PROCEDURE — 25010000002 PROPOFOL 10 MG/ML EMULSION: Performed by: NURSE ANESTHETIST, CERTIFIED REGISTERED

## 2024-07-11 PROCEDURE — 76000 FLUOROSCOPY <1 HR PHYS/QHP: CPT

## 2024-07-11 DEVICE — FLOSEAL HEMOSTATIC MATRIX, 5ML
Type: IMPLANTABLE DEVICE | Site: SPINE LUMBAR | Status: FUNCTIONAL
Brand: FLOSEAL HEMOSTATIC MATRIX

## 2024-07-11 DEVICE — WAX BONE HEMO AESCULAP 2.5GM: Type: IMPLANTABLE DEVICE | Site: SPINE LUMBAR | Status: FUNCTIONAL

## 2024-07-11 DEVICE — HEMOST ABS SURGIFOAM SZ100 8X12 10MM: Type: IMPLANTABLE DEVICE | Site: SPINE LUMBAR | Status: FUNCTIONAL

## 2024-07-11 RX ORDER — FENTANYL CITRATE 50 UG/ML
INJECTION, SOLUTION INTRAMUSCULAR; INTRAVENOUS
Status: COMPLETED
Start: 2024-07-11 | End: 2024-07-11

## 2024-07-11 RX ORDER — NALOXONE HYDROCHLORIDE 4 MG/.1ML
SPRAY NASAL
Qty: 2 EACH | Refills: 0 | Status: SHIPPED | OUTPATIENT
Start: 2024-07-11

## 2024-07-11 RX ORDER — MIDAZOLAM HYDROCHLORIDE 1 MG/ML
1 INJECTION INTRAMUSCULAR; INTRAVENOUS
Status: DISCONTINUED | OUTPATIENT
Start: 2024-07-11 | End: 2024-07-11 | Stop reason: HOSPADM

## 2024-07-11 RX ORDER — HYDROMORPHONE HYDROCHLORIDE 1 MG/ML
0.5 INJECTION, SOLUTION INTRAMUSCULAR; INTRAVENOUS; SUBCUTANEOUS
Status: DISCONTINUED | OUTPATIENT
Start: 2024-07-11 | End: 2024-07-11 | Stop reason: HOSPADM

## 2024-07-11 RX ORDER — SODIUM CHLORIDE 0.9 % (FLUSH) 0.9 %
10 SYRINGE (ML) INJECTION EVERY 12 HOURS SCHEDULED
Status: DISCONTINUED | OUTPATIENT
Start: 2024-07-11 | End: 2024-07-11 | Stop reason: HOSPADM

## 2024-07-11 RX ORDER — LIDOCAINE HYDROCHLORIDE 40 MG/ML
SOLUTION TOPICAL AS NEEDED
Status: DISCONTINUED | OUTPATIENT
Start: 2024-07-11 | End: 2024-07-11 | Stop reason: SURG

## 2024-07-11 RX ORDER — ONDANSETRON 2 MG/ML
INJECTION INTRAMUSCULAR; INTRAVENOUS AS NEEDED
Status: DISCONTINUED | OUTPATIENT
Start: 2024-07-11 | End: 2024-07-11 | Stop reason: SURG

## 2024-07-11 RX ORDER — FENTANYL CITRATE 50 UG/ML
50 INJECTION, SOLUTION INTRAMUSCULAR; INTRAVENOUS
Status: DISCONTINUED | OUTPATIENT
Start: 2024-07-11 | End: 2024-07-11 | Stop reason: HOSPADM

## 2024-07-11 RX ORDER — EPHEDRINE SULFATE 50 MG/ML
INJECTION INTRAVENOUS AS NEEDED
Status: DISCONTINUED | OUTPATIENT
Start: 2024-07-11 | End: 2024-07-11 | Stop reason: SURG

## 2024-07-11 RX ORDER — FAMOTIDINE 20 MG/1
20 TABLET, FILM COATED ORAL ONCE
Status: COMPLETED | OUTPATIENT
Start: 2024-07-11 | End: 2024-07-11

## 2024-07-11 RX ORDER — DROPERIDOL 2.5 MG/ML
0.62 INJECTION, SOLUTION INTRAMUSCULAR; INTRAVENOUS ONCE AS NEEDED
Status: DISCONTINUED | OUTPATIENT
Start: 2024-07-11 | End: 2024-07-11 | Stop reason: HOSPADM

## 2024-07-11 RX ORDER — OXYCODONE HYDROCHLORIDE AND ACETAMINOPHEN 5; 325 MG/1; MG/1
1 TABLET ORAL 3 TIMES DAILY PRN
Qty: 15 TABLET | Refills: 0 | Status: SHIPPED | OUTPATIENT
Start: 2024-07-11

## 2024-07-11 RX ORDER — FENTANYL CITRATE 50 UG/ML
INJECTION, SOLUTION INTRAMUSCULAR; INTRAVENOUS AS NEEDED
Status: DISCONTINUED | OUTPATIENT
Start: 2024-07-11 | End: 2024-07-11 | Stop reason: SURG

## 2024-07-11 RX ORDER — ROCURONIUM BROMIDE 10 MG/ML
INJECTION, SOLUTION INTRAVENOUS AS NEEDED
Status: DISCONTINUED | OUTPATIENT
Start: 2024-07-11 | End: 2024-07-11 | Stop reason: SURG

## 2024-07-11 RX ORDER — DEXMEDETOMIDINE HYDROCHLORIDE 4 UG/ML
INJECTION, SOLUTION INTRAVENOUS AS NEEDED
Status: DISCONTINUED | OUTPATIENT
Start: 2024-07-11 | End: 2024-07-11 | Stop reason: SURG

## 2024-07-11 RX ORDER — PROPOFOL 10 MG/ML
VIAL (ML) INTRAVENOUS AS NEEDED
Status: DISCONTINUED | OUTPATIENT
Start: 2024-07-11 | End: 2024-07-11 | Stop reason: SURG

## 2024-07-11 RX ORDER — SODIUM CHLORIDE, SODIUM LACTATE, POTASSIUM CHLORIDE, CALCIUM CHLORIDE 600; 310; 30; 20 MG/100ML; MG/100ML; MG/100ML; MG/100ML
9 INJECTION, SOLUTION INTRAVENOUS CONTINUOUS
Status: DISCONTINUED | OUTPATIENT
Start: 2024-07-11 | End: 2024-07-11 | Stop reason: HOSPADM

## 2024-07-11 RX ORDER — SODIUM CHLORIDE 0.9 % (FLUSH) 0.9 %
10 SYRINGE (ML) INJECTION AS NEEDED
Status: DISCONTINUED | OUTPATIENT
Start: 2024-07-11 | End: 2024-07-11 | Stop reason: HOSPADM

## 2024-07-11 RX ORDER — BUPIVACAINE HYDROCHLORIDE AND EPINEPHRINE 2.5; 5 MG/ML; UG/ML
INJECTION, SOLUTION EPIDURAL; INFILTRATION; INTRACAUDAL; PERINEURAL AS NEEDED
Status: DISCONTINUED | OUTPATIENT
Start: 2024-07-11 | End: 2024-07-11 | Stop reason: HOSPADM

## 2024-07-11 RX ORDER — SODIUM CHLORIDE 9 MG/ML
40 INJECTION, SOLUTION INTRAVENOUS AS NEEDED
Status: DISCONTINUED | OUTPATIENT
Start: 2024-07-11 | End: 2024-07-11 | Stop reason: HOSPADM

## 2024-07-11 RX ORDER — NALOXONE HYDROCHLORIDE 0.4 MG/ML
INJECTION, SOLUTION INTRAMUSCULAR; INTRAVENOUS; SUBCUTANEOUS AS NEEDED
Status: DISCONTINUED | OUTPATIENT
Start: 2024-07-11 | End: 2024-07-11 | Stop reason: SURG

## 2024-07-11 RX ORDER — LIDOCAINE HYDROCHLORIDE 10 MG/ML
INJECTION, SOLUTION EPIDURAL; INFILTRATION; INTRACAUDAL; PERINEURAL AS NEEDED
Status: DISCONTINUED | OUTPATIENT
Start: 2024-07-11 | End: 2024-07-11 | Stop reason: SURG

## 2024-07-11 RX ORDER — MAGNESIUM HYDROXIDE 1200 MG/15ML
LIQUID ORAL AS NEEDED
Status: DISCONTINUED | OUTPATIENT
Start: 2024-07-11 | End: 2024-07-11 | Stop reason: HOSPADM

## 2024-07-11 RX ORDER — LIDOCAINE HYDROCHLORIDE 10 MG/ML
0.5 INJECTION, SOLUTION EPIDURAL; INFILTRATION; INTRACAUDAL; PERINEURAL ONCE AS NEEDED
Status: COMPLETED | OUTPATIENT
Start: 2024-07-11 | End: 2024-07-11

## 2024-07-11 RX ADMIN — FAMOTIDINE 20 MG: 20 TABLET, FILM COATED ORAL at 09:37

## 2024-07-11 RX ADMIN — HYDROMORPHONE HYDROCHLORIDE 0.5 MG: 1 INJECTION, SOLUTION INTRAMUSCULAR; INTRAVENOUS; SUBCUTANEOUS at 12:22

## 2024-07-11 RX ADMIN — SUGAMMADEX 200 MG: 100 INJECTION, SOLUTION INTRAVENOUS at 11:32

## 2024-07-11 RX ADMIN — HYDROMORPHONE HYDROCHLORIDE 0.5 MG: 1 INJECTION, SOLUTION INTRAMUSCULAR; INTRAVENOUS; SUBCUTANEOUS at 12:08

## 2024-07-11 RX ADMIN — FENTANYL CITRATE 50 MCG: 50 INJECTION, SOLUTION INTRAMUSCULAR; INTRAVENOUS at 10:40

## 2024-07-11 RX ADMIN — LIDOCAINE HYDROCHLORIDE 100 MG: 10 SOLUTION INTRAVENOUS at 10:35

## 2024-07-11 RX ADMIN — FENTANYL CITRATE 50 MCG: 50 INJECTION, SOLUTION INTRAMUSCULAR; INTRAVENOUS at 10:54

## 2024-07-11 RX ADMIN — NALXONE HYDROCHLORIDE 0.04 MG: 0.4 INJECTION INTRAMUSCULAR; INTRAVENOUS; SUBCUTANEOUS at 11:48

## 2024-07-11 RX ADMIN — ROCURONIUM BROMIDE 50 MG: 10 INJECTION INTRAVENOUS at 10:36

## 2024-07-11 RX ADMIN — FENTANYL CITRATE 50 MCG: 50 INJECTION, SOLUTION INTRAMUSCULAR; INTRAVENOUS at 12:39

## 2024-07-11 RX ADMIN — NALXONE HYDROCHLORIDE 0.08 MG: 0.4 INJECTION INTRAMUSCULAR; INTRAVENOUS; SUBCUTANEOUS at 11:47

## 2024-07-11 RX ADMIN — SODIUM CHLORIDE 2 G: 900 INJECTION INTRAVENOUS at 10:45

## 2024-07-11 RX ADMIN — LIDOCAINE HYDROCHLORIDE 0.2 ML: 10 INJECTION, SOLUTION EPIDURAL; INFILTRATION; INTRACAUDAL; PERINEURAL at 09:36

## 2024-07-11 RX ADMIN — ONDANSETRON 4 MG: 2 INJECTION INTRAMUSCULAR; INTRAVENOUS at 11:28

## 2024-07-11 RX ADMIN — LIDOCAINE HYDROCHLORIDE 1 EACH: 40 SOLUTION TOPICAL at 10:38

## 2024-07-11 RX ADMIN — DEXMEDETOMIDINE HYDROCHLORIDE IN 0.9% SODIUM CHLORIDE 4 MCG: 4 INJECTION INTRAVENOUS at 10:34

## 2024-07-11 RX ADMIN — SUGAMMADEX 200 MG: 100 INJECTION, SOLUTION INTRAVENOUS at 11:50

## 2024-07-11 RX ADMIN — FENTANYL CITRATE 50 MCG: 50 INJECTION, SOLUTION INTRAMUSCULAR; INTRAVENOUS at 12:48

## 2024-07-11 RX ADMIN — EPHEDRINE SULFATE 10 MG: 50 INJECTION INTRAVENOUS at 11:01

## 2024-07-11 RX ADMIN — PROPOFOL 200 MG: 10 INJECTION, EMULSION INTRAVENOUS at 10:35

## 2024-07-11 RX ADMIN — FENTANYL CITRATE 50 MCG: 50 INJECTION, SOLUTION INTRAMUSCULAR; INTRAVENOUS at 10:34

## 2024-07-11 RX ADMIN — FENTANYL CITRATE 50 MCG: 50 INJECTION, SOLUTION INTRAMUSCULAR; INTRAVENOUS at 12:01

## 2024-07-11 RX ADMIN — DEXMEDETOMIDINE HYDROCHLORIDE IN 0.9% SODIUM CHLORIDE 8 MCG: 4 INJECTION INTRAVENOUS at 10:42

## 2024-07-11 RX ADMIN — SODIUM CHLORIDE, POTASSIUM CHLORIDE, SODIUM LACTATE AND CALCIUM CHLORIDE 9 ML/HR: 600; 310; 30; 20 INJECTION, SOLUTION INTRAVENOUS at 09:36

## 2024-07-11 RX ADMIN — FENTANYL CITRATE 50 MCG: 50 INJECTION, SOLUTION INTRAMUSCULAR; INTRAVENOUS at 10:51

## 2024-07-11 NOTE — OP NOTE
NEUROSURGICAL OPERATIVE NOTE        PREOPERATIVE DIAGNOSIS:    Right L4-5 recess and foraminal stenosis      POSTOPERATIVE DIAGNOSIS:  Same      PROCEDURE:  Right L4-5 laminotomy, medial facetectomy, foraminotomy      SURGEON:  Tj Chandler M.D.      ASSISTANT: Kathy Collazo PA-C    PAC assisted with:   Suctioning   Retraction   Tying   Suturing   Closing   Application of dressing   Skilled neurosurgery PA assistance was necessary to perform this procedure.        ANESTHESIA:  General      ESTIMATED BLOOD LOSS: Minimal      SPECIMEN: None      DRAINS: None      COMPLICATIONS:  None      CLINICAL NOTE:  The patient is a 64-year-old gentleman with progressive back and right lower extremity pain.  Most of the pain involves his hip and leg.  He has significant spinal stenosis and given his nerve root compromise he presents at this time for lumbar decompression on the right at L4-5.  The nature of the procedure as well as the potential risks, complications, limitations, and alternatives to the procedure were discussed at length with the patient and the patient has agreed to proceed with surgery.      TECHNICAL NOTE:  The patient was brought to the operating room and while on his cart, general endotracheal anesthesia was achieved.  He was then turned prone onto the Cloward saddle frame and special care was ensured to protect pressure points.  His low back was prepared and draped in the usual fashion.  A localizing radiograph was obtained with the spinal needle in the lumbosacral midline.  Based on this, a 2.5 to 3 cm vertical incision was fashioned overlying the L4-5 level.  Underlying tissues were divided with cautery to provide exposure to the right L4 and L5 hemilamina.  Laminotomy was fashioned.  Another radiograph confirmed the operative level.  Laminotomy was completed using Kerrison punches and drill.  The medial aspect of the facet was drilled down.  This was markedly overgrown.  The facet complex was  undercut.  The neural foramen was decompressed with Kerrison punch.  The dural sac was retracted medially over some spurring across the disk space.  There was no soft disk protrusion.  The disk space was not entered.  Good decompression of the thecal sac and nerve root was accomplished.  A blunt ball probe could readily be passed along the L5 nerve root into the foramen at completion.   With the Valsalva maneuver there was no significant bleeding or evidence of CSF leak.  The wound was washed out with a saline solution.  The paraspinous muscle and fascia were reapproximated in interrupted fashion with 0 Vicryl suture; 0.25% Marcaine was instilled in the paraspinous musculature and subcutaneous tissues. Subcutaneous tissues were closed in layers with 3-0 Vicryl suture. The skin was closed in a running subcuticular fashion with 3-0 Vicryl suture.  A dermal sealant and sterile dressing were applied. He was rolled onto his cart, extubated and taken to the recovery room in satisfactory condition.             Tj Chandler M.D.

## 2024-07-11 NOTE — ANESTHESIA POSTPROCEDURE EVALUATION
Patient: Rojas Mcadams    Procedure Summary       Date: 07/11/24 Room / [facility]:  [facility]    Anesthesia Start: 1032 Anesthesia Stop: 1156    Procedure: FORAMINOTOMY RIGHT L4-5 (Spine Lumbar) Diagnosis:       Stenosis of lateral recess of lumbar spine      (Stenosis of lateral recess of lumbar spine [M48.061])    Surgeons: Tj Chandler MD Provider: Ayaz Palumbo MD    Anesthesia Type: general ASA Status: 3            Anesthesia Type: general    Vitals  Vitals Value Taken Time   /66 07/11/24 1157   Temp 97 °F (36.1 °C) 07/11/24 1157   Pulse 82 07/11/24 1157   Resp 18 07/11/24 1157   SpO2 96 % 07/11/24 1157           Post Anesthesia Care and Evaluation    Patient location during evaluation: PACU  Patient participation: complete - patient participated  Level of consciousness: awake and alert  Pain management: adequate    Airway patency: patent  Anesthetic complications: No anesthetic complications  PONV Status: none  Cardiovascular status: hemodynamically stable and acceptable  Respiratory status: nonlabored ventilation, acceptable and nasal cannula  Hydration status: acceptable

## 2024-07-11 NOTE — ANESTHESIA PREPROCEDURE EVALUATION
Anesthesia Evaluation     Patient summary reviewed and Nursing notes reviewed   no history of anesthetic complications (Hx of spinal headache):   NPO Solid Status: > 8 hours  NPO Liquid Status: > 2 hours           Airway   Mallampati: I  TM distance: >3 FB  Neck ROM: full  No difficulty expected  Dental - normal exam     Pulmonary - normal exam    breath sounds clear to auscultation  (+) a smoker (Quit 2013) Former,sleep apnea on CPAP  Cardiovascular - normal exam  Exercise tolerance: good (4-7 METS)    ECG reviewed  Patient on routine beta blocker  Rhythm: regular  Rate: normal    (+) hypertension, CAD, hyperlipidemia    ROS comment: EKG 7/1/24:  HR 67  Normal sinus rhythm  Normal ECG  When compared with ECG of 02-NOV-2023 14:39,  No significant change was found      Neuro/Psych  (+) psychiatric history Anxiety and Depression  GI/Hepatic/Renal/Endo    (+) GERD well controlled, thyroid problem hypothyroidism    Musculoskeletal     (+) back pain  Abdominal    Substance History - negative use     OB/GYN          Other   arthritis,                   Anesthesia Plan    ASA 3     general     intravenous induction     Anesthetic plan, risks, benefits, and alternatives have been provided, discussed and informed consent has been obtained with: patient.    Plan discussed with CRNA.    CODE STATUS:

## 2024-07-11 NOTE — ANESTHESIA PROCEDURE NOTES
Airway  Urgency: elective    Date/Time: 7/11/2024 10:38 AM  Airway not difficult    General Information and Staff    Patient location during procedure: OR  CRNA/CAA: Meche Germain CRNA    Indications and Patient Condition  Indications for airway management: airway protection    Preoxygenated: yes  MILS not maintained throughout  Mask difficulty assessment: 1 - vent by mask    Final Airway Details  Final airway type: endotracheal airway      Successful airway: ETT  Cuffed: yes   Successful intubation technique: video laryngoscopy  Facilitating devices/methods: intubating stylet  Endotracheal tube insertion site: oral  Blade: Dominguez  Blade size: 3  ETT size (mm): 8.0  Cormack-Lehane Classification: grade I - full view of glottis  Placement verified by: chest auscultation and capnometry   Cuff volume (mL): 8  Measured from: lips  ETT/EBT  to lips (cm): 24  Number of attempts at approach: 1  Assessment: lips, teeth, and gum same as pre-op and atraumatic intubation    Additional Comments  Dominguez used electively. Negative epigastric sounds, Breath sound equal bilaterally with symmetric chest rise and fall.

## 2024-07-11 NOTE — INTERVAL H&P NOTE
[facility] Pre-op    Full history and physical note from office is attached.    /92 (BP Location: Right arm, Patient Position: Lying)   Pulse 65   Temp 97.7 °F (36.5 °C) (Temporal)   Resp 18   SpO2 100%     Immunizations:  Influenza:  No  Pneumococcal:  No  Tetanus:  UTD    Review of Systems:  Constitutional-- No fever, chills or sweats. No fatigue.  CV-- No chest pain, palpitation or syncope  Resp-- No SOB, cough, hemoptysis  Skin--No rashes or lesions    Physical Exam:  Heart:   Regular rate and rhythm, S1 and S2 normal  Lungs: Clear to auscultation bilaterally, respirations unlabored    LAB Results:  Lab Results   Component Value Date    WBC 8.02 07/01/2024    HGB 14.8 07/01/2024    HCT 44.0 07/01/2024    MCV 89.8 07/01/2024     07/01/2024    NEUTROABS 5.11 11/02/2023    GLUCOSE 110 (H) 11/02/2023    BUN 21 11/02/2023    CREATININE 1.06 11/02/2023     11/02/2023    K 4.3 07/01/2024    CL 99 11/02/2023    CO2 31.0 (H) 11/02/2023    CALCIUM 9.4 11/02/2023    ALBUMIN 4.5 11/02/2023    AST 22 11/02/2023    ALT 30 11/02/2023    BILITOT 0.4 11/02/2023    INR 1.02 11/02/2023     Study Result    Narrative & Impression   XR SPINE LUMBAR FLEX AND EXT     Date of Exam: 6/18/2024 2:30 PM EDT     Indication: r/o instability     Comparison: Lumbar spine MRI 5/22/2024     Technique: 2 radiographs of the lumbar spine were obtained.        Findings:  Lateral flexion and extension radiographs demonstrate fixed 4-5 mm anterolisthesis of L4 on L5, without dynamic listhesis/instability between flexion and extension. There is degenerative disc disease, severe at L5-S1. There is moderate to severe facet   arthropathy.    Impression:  Lateral flexion and extension radiographs demonstrate fixed 4-5 mm anterolisthesis of L4 on L5, without dynamic listhesis/instability between flexion and extension.     Cancer Staging (if applicable)  Cancer Patient: __ yes __no __unknown__N/A; If yes, clinical stage  T:__ N:__M:__, stage group or __N/A      Impression: Stenosis of lateral recess of lumbar spine       Plan: FORAMINOTOMY RIGHT L4-5, POSSIBLE DISCECTOMY       Lisa Dotson, JEREMY   7/11/2024   09:28 EDT

## 2024-07-11 NOTE — LETTER
[facility]    Patient: Rojas Mcadams  : 1960      Dear Juan,    I just completed the right L4-5 laminotomy and foraminotomy on Mr. Mcadams to address his lateral recess syndrome.  Thus far all has gone well.  He will likely go home in a couple of hours and then follow-up in my clinic in about 3 weeks.      With kindest regards,    Tj Chandler MD  24  11:42 EDT

## 2024-07-31 ENCOUNTER — OFFICE VISIT (OUTPATIENT)
Dept: NEUROSURGERY | Facility: CLINIC | Age: 64
End: 2024-07-31
Payer: COMMERCIAL

## 2024-07-31 VITALS
TEMPERATURE: 97.3 F | HEIGHT: 72 IN | SYSTOLIC BLOOD PRESSURE: 112 MMHG | WEIGHT: 205.4 LBS | DIASTOLIC BLOOD PRESSURE: 62 MMHG | BODY MASS INDEX: 27.82 KG/M2

## 2024-07-31 DIAGNOSIS — M54.16 LUMBAR RADICULOPATHY: Primary | ICD-10-CM

## 2024-07-31 PROCEDURE — 99024 POSTOP FOLLOW-UP VISIT: CPT | Performed by: PHYSICIAN ASSISTANT

## 2024-07-31 RX ORDER — LEVOTHYROXINE SODIUM 0.07 MG/1
75 TABLET ORAL
COMMUNITY
Start: 2024-07-09

## 2024-07-31 NOTE — PROGRESS NOTES
"Patient: Rojas Mcadams  : 1960  Chart #: [id number]    Date of Service: 2024    CHIEF COMPLAINT:     History of Present Illness       Past Medical History:   Diagnosis Date   • Arthritis    • Arthritis of back 5 months   • Coronary artery disease    • Disease of thyroid gland    • Frozen shoulder 20 years   • GA (granuloma annulare)    • GERD (gastroesophageal reflux disease)    • Hip arthrosis 5months   • Hyperlipidemia    • Hypertension    • Knee swelling 20 years   • Low back pain 2024    Sciatica RSide down to feet   • Low back strain 5 months   • Lumbosacral disc disease 5 months   • Periarthritis of shoulder 20 years   • Rotator cuff syndrome 15 years   • Sleep apnea     cpap compliant   • Spinal headache    • Tennis elbow 15yrs   • Wears eyeglasses          Current Outpatient Medications:   •  BD Integra Syringe 25G X 1\" 3 ML misc, , Disp: , Rfl:   •  cyclobenzaprine (FLEXERIL) 10 MG tablet, Take 1 tablet by mouth 3 (Three) Times a Day As Needed for Muscle Spasms., Disp: 40 tablet, Rfl: 1  •  diclofenac (VOLTAREN) 75 MG EC tablet, Take 1 tablet by mouth 2 (Two) Times a Day., Disp: , Rfl:   •  escitalopram (LEXAPRO) 10 MG tablet, Take 1 tablet by mouth Daily., Disp: , Rfl:   •  fluticasone (CUTIVATE) 0.05 % cream, Apply 1 Application topically to the appropriate area as directed Daily., Disp: , Rfl:   •  gabapentin (NEURONTIN) 100 MG capsule, TAKE ONE CAPSULE BY MOUTH THREE TIMES A DAY, Disp: 90 capsule, Rfl: 1  •  ketoconazole (NIZORAL) 2 % cream, Apply 1 Application topically to the appropriate area as directed As Needed., Disp: , Rfl:   •  levothyroxine (SYNTHROID, LEVOTHROID) 75 MCG tablet, Take 1 tablet by mouth., Disp: , Rfl:   •  meloxicam (MOBIC) 7.5 MG tablet, Take 1 tablet by mouth Daily., Disp: 30 tablet, Rfl: 0  •  metoprolol succinate XL (TOPROL-XL) 50 MG 24 hr tablet, Take 1 tablet by mouth Daily., Disp: , Rfl:   •  montelukast (SINGULAIR) 10 MG tablet, Take 1 tablet by " mouth Every Night., Disp: , Rfl:   •  naloxone (NARCAN) 4 MG/0.1ML nasal spray, Call 911. Don't prime. [place] in 1 nostril for overdose. Repeat in 2-3 minutes in other nostril if no or minimal breathing/responsiveness., Disp: 2 each, Rfl: 0  •  NP Thyroid 30 MG tablet, Take 1 tablet by mouth Daily., Disp: , Rfl:   •  omeprazole (priLOSEC) 40 MG capsule, Take 1 capsule by mouth 1 (One) Time As Needed (gerd)., Disp: , Rfl:   •  oxyCODONE-acetaminophen (PERCOCET) 5-325 MG per tablet, Take 1 tablet by mouth 3 (Three) Times a Day As Needed (Pain)., Disp: 15 tablet, Rfl: 0  •  rosuvastatin (CRESTOR) 10 MG tablet, Take 1 tablet by mouth Every Night., Disp: , Rfl:   •  Testosterone Cypionate (DEPOTESTOTERONE CYPIONATE) 200 MG/ML injection, Inject 1 mL into the appropriate muscle as directed by prescriber Every 14 (Fourteen) Days., Disp: , Rfl:   •  traMADol (ULTRAM) 50 MG tablet, Take 1 tablet by mouth., Disp: , Rfl:   •  traZODone (DESYREL) 50 MG tablet, TAKE ONE TABLET BY MOUTH ONCE NIGHTLY, Disp: 30 tablet, Rfl: 1  No current facility-administered medications for this visit.    Facility-Administered Medications Ordered in Other Visits:   •  mupirocin (BACTROBAN) 2 % nasal ointment, , Nasal, BID, Tj Chandler MD    Past Surgical History:   Procedure Laterality Date   • COLONOSCOPY     • ELBOW PROCEDURE Right 05/01/2007    [facility] elbow   • KNEE ARTHROSCOPY Left    • LUMBAR DISCECTOMY N/A 7/11/2024    Procedure: FORAMINOTOMY RIGHT L4-5;  Surgeon: Tj Chandler MD;  Location:  [facility];  Service: Neurosurgery;  Laterality: N/A;   • TOTAL KNEE ARTHROPLASTY Left 12/07/2022    Procedure: TOTAL KNEE ARTHROPLASTY - LEFT;  Surgeon: Delmer Hi MD;  Location:  [facility];  Service: Orthopedics;  Laterality: Left;   • TOTAL KNEE ARTHROPLASTY Right 11/15/2023    Procedure: TOTAL KNEE ARTHROPLASTY WITH CARLOS ROBOT - RIGHT;  Surgeon: Delmer Hi MD;  Location: [facility];  Service: Robotics - Ortho;   Laterality: Right;       Social History     Socioeconomic History   • Marital status:    Tobacco Use   • Smoking status: Former     Current packs/day: 0.00     Average packs/day: 1 pack/day for 35.4 years (35.4 ttl pk-yrs)     Types: Cigarettes     Start date: 1978     Quit date: 5/15/2013     Years since quittin.2     Passive exposure: Past   • Smokeless tobacco: Never   Vaping Use   • Vaping status: Never Used   Substance and Sexual Activity   • Alcohol use: Not Currently     Comment: rare social   • Drug use: No   • Sexual activity: Defer     Partners: Female     Birth control/protection: None         Review of Systems   Constitutional:  Negative for activity change, appetite change, chills, diaphoresis, fatigue, fever and unexpected weight change.   HENT:  Negative for congestion, dental problem, drooling, ear discharge, ear pain, facial swelling, hearing loss, mouth sores, nosebleeds, postnasal drip, rhinorrhea, sinus pressure, sinus pain, sneezing, sore throat, tinnitus, trouble swallowing and voice change.    Eyes:  Negative for photophobia, pain, discharge, redness, itching and visual disturbance.   Respiratory:  Negative for apnea, cough, choking, chest tightness, shortness of breath, wheezing and stridor.    Cardiovascular:  Negative for chest pain, palpitations and leg swelling.   Gastrointestinal:  Negative for abdominal distention, abdominal pain, anal bleeding, blood in stool, constipation, diarrhea, nausea, rectal pain and vomiting.   Endocrine: Negative for cold intolerance, heat intolerance, polydipsia, polyphagia and polyuria.   Genitourinary:  Negative for decreased urine volume, difficulty urinating, dysuria, enuresis, flank pain, frequency, genital sores, hematuria, penile discharge, penile pain, penile swelling, scrotal swelling, testicular pain and urgency.   Musculoskeletal:  Negative for arthralgias, back pain, gait problem, joint swelling, myalgias, neck pain and neck  "stiffness.   Skin:  Negative for color change, pallor, rash and wound.   Allergic/Immunologic: Negative for environmental allergies, food allergies and immunocompromised state.   Neurological:  Negative for dizziness, tremors, seizures, syncope, facial asymmetry, speech difficulty, weakness, light-headedness, numbness and headaches.   Hematological:  Negative for adenopathy. Does not bruise/bleed easily.   Psychiatric/Behavioral:  Negative for agitation, behavioral problems, confusion, decreased concentration, dysphoric mood, hallucinations, self-injury, sleep disturbance and suicidal ideas. The patient is not nervous/anxious and is not hyperactive.      Objective   Vital Signs: Blood pressure 112/62, temperature 97.3 °F (36.3 °C), temperature source Infrared, height 182.9 cm (72\"), weight 93.2 kg (205 lb 6.4 oz).  Physical Exam  Musculoskeletal:     Strength is intact in upper and lower extremities to direct testing.     Station and gait are normal.     Straight leg raising is negative.   Neurologic:     Muscle tone is normal throughout.     Coordination is intact.     Deep tendon reflexes: 2+ and symmetrical.     Sensation is intact to light touch throughout.     Patient is oriented to person, place, and time.         Independent review of radiographic imaging:     Assessment & Plan   Diagnosis:    Medical Decision Making:     There are no diagnoses linked to this encounter.                              Paula Tate LPN  Patient Care Team:  James Baumann MD as PCP - General (Internal Medicine)  James Baumann MD as Primary Care Provider (Internal Medicine)  Tj Chandler MD as Consulting Physician (Neurosurgery)  Delmer Hi MD as Referring Physician (Orthopedic Surgery)              "

## 2024-07-31 NOTE — PROGRESS NOTES
"Patient: Rojas Mcadams  : 1960  Chart #: [id number]    Date of Service: 2024    CHIEF COMPLAINT: Right L4-5 recess and foraminal stenosis     History of Present Illness Patient is a 64-year-old gentleman who presented with progressive back and right lower extremity pain.  Most of his pain involves the hip and leg.  Preoperative studies demonstrated significant spinal stenosis and nerve root compromise.  As such on 2024 he presented for right L4-5 foraminotomy.    Today patient is 3 weeks postop.  Leg pain is completely gone.  He denies back or hip pain.  He has been guarded with his activities but feels more than ready to get back to his normal workout regimen.  No incisional issues.  He continues with gabapentin for his knees.  He underwent knee replacement surgery with Dr. Trevizo in November and then in December of last year.      Past Medical History:   Diagnosis Date    Arthritis     Arthritis of back 5 months    Coronary artery disease     Disease of thyroid gland     Frozen shoulder 20 years    GA (granuloma annulare)     GERD (gastroesophageal reflux disease)     Hip arthrosis 5months    Hyperlipidemia     Hypertension     Knee swelling 20 years    Low back pain 2024    Sciatica RSide down to feet    Low back strain 5 months    Lumbosacral disc disease 5 months    Periarthritis of shoulder 20 years    Rotator cuff syndrome 15 years    Sleep apnea     cpap compliant    Spinal headache     Tennis elbow 15yrs    Wears eyeglasses          Current Outpatient Medications:     BD Integra Syringe 25G X 1\" 3 ML misc, , Disp: , Rfl:     cyclobenzaprine (FLEXERIL) 10 MG tablet, Take 1 tablet by mouth 3 (Three) Times a Day As Needed for Muscle Spasms., Disp: 40 tablet, Rfl: 1    diclofenac (VOLTAREN) 75 MG EC tablet, Take 1 tablet by mouth 2 (Two) Times a Day., Disp: , Rfl:     escitalopram (LEXAPRO) 10 MG tablet, Take 1 tablet by mouth Daily., Disp: , Rfl:     fluticasone (CUTIVATE) " 0.05 % cream, Apply 1 Application topically to the appropriate area as directed Daily., Disp: , Rfl:     gabapentin (NEURONTIN) 100 MG capsule, TAKE ONE CAPSULE BY MOUTH THREE TIMES A DAY, Disp: 90 capsule, Rfl: 1    ketoconazole (NIZORAL) 2 % cream, Apply 1 Application topically to the appropriate area as directed As Needed., Disp: , Rfl:     levothyroxine (SYNTHROID, LEVOTHROID) 75 MCG tablet, Take 1 tablet by mouth., Disp: , Rfl:     meloxicam (MOBIC) 7.5 MG tablet, Take 1 tablet by mouth Daily., Disp: 30 tablet, Rfl: 0    metoprolol succinate XL (TOPROL-XL) 50 MG 24 hr tablet, Take 1 tablet by mouth Daily., Disp: , Rfl:     montelukast (SINGULAIR) 10 MG tablet, Take 1 tablet by mouth Every Night., Disp: , Rfl:     naloxone (NARCAN) 4 MG/0.1ML nasal spray, Call 911. Don't prime. [place] in 1 nostril for overdose. Repeat in 2-3 minutes in other nostril if no or minimal breathing/responsiveness., Disp: 2 each, Rfl: 0    NP Thyroid 30 MG tablet, Take 1 tablet by mouth Daily., Disp: , Rfl:     omeprazole (priLOSEC) 40 MG capsule, Take 1 capsule by mouth 1 (One) Time As Needed (gerd)., Disp: , Rfl:     oxyCODONE-acetaminophen (PERCOCET) 5-325 MG per tablet, Take 1 tablet by mouth 3 (Three) Times a Day As Needed (Pain)., Disp: 15 tablet, Rfl: 0    rosuvastatin (CRESTOR) 10 MG tablet, Take 1 tablet by mouth Every Night., Disp: , Rfl:     Testosterone Cypionate (DEPOTESTOTERONE CYPIONATE) 200 MG/ML injection, Inject 1 mL into the appropriate muscle as directed by prescriber Every 14 (Fourteen) Days., Disp: , Rfl:     traMADol (ULTRAM) 50 MG tablet, Take 1 tablet by mouth., Disp: , Rfl:     traZODone (DESYREL) 50 MG tablet, TAKE ONE TABLET BY MOUTH ONCE NIGHTLY, Disp: 30 tablet, Rfl: 1  No current facility-administered medications for this visit.    Facility-Administered Medications Ordered in Other Visits:     mupirocin (BACTROBAN) 2 % nasal ointment, , Nasal, BID, Tj Chandler MD    Past Surgical History:   Procedure  "Laterality Date    COLONOSCOPY      ELBOW PROCEDURE Right 2007    golfers elbow    KNEE ARTHROSCOPY Left     LUMBAR DISCECTOMY N/A 2024    Procedure: FORAMINOTOMY RIGHT L4-5;  Surgeon: Tj Chandler MD;  Location:  [facility];  Service: Neurosurgery;  Laterality: N/A;    TOTAL KNEE ARTHROPLASTY Left 2022    Procedure: TOTAL KNEE ARTHROPLASTY - LEFT;  Surgeon: Delmer Hi MD;  Location:  [facility];  Service: Orthopedics;  Laterality: Left;    TOTAL KNEE ARTHROPLASTY Right 11/15/2023    Procedure: TOTAL KNEE ARTHROPLASTY WITH CARLOS ROBOT - RIGHT;  Surgeon: Delmer Hi MD;  Location:  [facility];  Service: Robotics - Ortho;  Laterality: Right;       Social History     Socioeconomic History    Marital status:    Tobacco Use    Smoking status: Former     Current packs/day: 0.00     Average packs/day: 1 pack/day for 35.4 years (35.4 ttl pk-yrs)     Types: Cigarettes     Start date: 1978     Quit date: 5/15/2013     Years since quittin.2     Passive exposure: Past    Smokeless tobacco: Never   Vaping Use    Vaping status: Never Used   Substance and Sexual Activity    Alcohol use: Not Currently     Comment: rare social    Drug use: No    Sexual activity: Defer     Partners: Female     Birth control/protection: None         Review of Systems    Objective   Vital Signs: Blood pressure 112/62, temperature 97.3 °F (36.3 °C), temperature source Infrared, height 182.9 cm (72\"), weight 93.2 kg (205 lb 6.4 oz).  Physical Exam  Vitals and nursing note reviewed.   Constitutional:       General: He is not in acute distress.     Appearance: He is well-developed.   Skin:     Comments: Well-healed lumbar incision   Psychiatric:         Behavior: Behavior normal.         Thought Content: Thought content normal.           Assessment & Plan   Diagnosis: Right L4-5 recess and foraminal stenosis status post foraminotomy    Medical Decision Making: Patient is 3 weeks postop and doing " excellent.  He may slowly resume normal activities.  We discussed some specific do's and don'ts in that regard for when he returns to the gym.  He is going to hold off on golfing for a few more weeks.  He will follow-up with Dr. Chandler in 6 weeks to check his progress.    Diagnoses and all orders for this visit:    1. Lumbar radiculopathy (Primary)                                  Ruchi Aguila PA-C  Patient Care Team:  James Baumann MD as PCP - General (Internal Medicine)  James Baumann MD as Primary Care Provider (Internal Medicine)  Tj Chandler MD as Consulting Physician (Neurosurgery)  Delmer Hi MD as Referring Physician (Orthopedic Surgery)                Answers submitted by the patient for this visit:  Primary Reason for Visit (Submitted on 7/25/2024)  What is the primary reason for your visit?: Other  Other (Submitted on 7/25/2024)  Please describe your symptoms.: A follow up 3 wk after surgery  Have you had these symptoms before?: No  How long have you been having these symptoms?: Greater than 2 weeks

## 2024-09-04 DIAGNOSIS — Z96.651 STATUS POST TOTAL RIGHT KNEE REPLACEMENT: ICD-10-CM

## 2024-09-04 DIAGNOSIS — G57.01 PIRIFORMIS SYNDROME OF RIGHT SIDE: ICD-10-CM

## 2024-09-04 RX ORDER — GABAPENTIN 100 MG/1
100 CAPSULE ORAL 3 TIMES DAILY
Qty: 90 CAPSULE | OUTPATIENT
Start: 2024-09-04

## 2024-09-04 NOTE — TELEPHONE ENCOUNTER
Would defer to Dr. Chandler to see if he still wants him on this after his recent back surgery.    Kev SALOMON

## 2024-09-04 NOTE — TELEPHONE ENCOUNTER
I called and spoke with Mr. Mcadams. He is requesting a refill.  I explained to him that he needs to check with Dr. Melendrez and see if he wants him to continue with that medication. If so he can prescribe.  He recently had back surgery.  Zoe

## 2024-09-17 ENCOUNTER — OFFICE VISIT (OUTPATIENT)
Dept: NEUROSURGERY | Facility: CLINIC | Age: 64
End: 2024-09-17
Payer: COMMERCIAL

## 2024-09-17 VITALS — TEMPERATURE: 98 F | HEIGHT: 72 IN | WEIGHT: 210 LBS | BODY MASS INDEX: 28.44 KG/M2

## 2024-09-17 DIAGNOSIS — M54.16 LUMBAR RADICULOPATHY: Primary | ICD-10-CM

## 2024-09-17 PROCEDURE — 99024 POSTOP FOLLOW-UP VISIT: CPT | Performed by: NEUROLOGICAL SURGERY

## 2025-01-20 RX ORDER — TRAZODONE HYDROCHLORIDE 50 MG/1
50 TABLET, FILM COATED ORAL NIGHTLY
Qty: 30 TABLET | Refills: 0 | Status: SHIPPED | OUTPATIENT
Start: 2025-01-20

## 2025-02-21 RX ORDER — TRAZODONE HYDROCHLORIDE 50 MG/1
50 TABLET, FILM COATED ORAL NIGHTLY
Qty: 30 TABLET | Refills: 0 | Status: SHIPPED | OUTPATIENT
Start: 2025-02-21

## (undated) DEVICE — PK NEURO DISC 10

## (undated) DEVICE — DRSNG PAD ABD 8X10IN STRL

## (undated) DEVICE — GLV SURG PREMIERPRO ORTHO LTX PF SZ8 BRN

## (undated) DEVICE — STRYKER PERFORMANCE SERIES SAGITTAL BLADE: Brand: STRYKER PERFORMANCE SERIES

## (undated) DEVICE — BLANKT WARM UPPR/BDY ARM/OUT 57X196CM

## (undated) DEVICE — INTENDED FOR TISSUE SEPARATION, AND OTHER PROCEDURES THAT REQUIRE A SHARP SURGICAL BLADE TO PUNCTURE OR CUT.: Brand: BARD-PARKER ® STAINLESS STEEL BLADES

## (undated) DEVICE — TOOL MR8-14MH30D MR8 14CM MATCH DMD 3MM: Brand: MIDAS REX MR8

## (undated) DEVICE — DISPOSABLE TOURNIQUET CUFF SINGLE BLADDER, DUAL PORT AND QUICK CONNECT CONNECTOR: Brand: COLOR CUFF

## (undated) DEVICE — ANTIBACTERIAL UNDYED BRAIDED (POLYGLACTIN 910), SYNTHETIC ABSORBABLE SUTURE: Brand: COATED VICRYL

## (undated) DEVICE — SUT MONOCRYL PLS ANTIB UND 3/0  PS1 27IN

## (undated) DEVICE — GLV SURG PREMIERPRO MIC LTX PF SZ8 BRN

## (undated) DEVICE — TRY EPID SFTY 18G 3.5IN 1T7680

## (undated) DEVICE — CONN TBG Y 5 IN 1 LF STRL

## (undated) DEVICE — TBG PENCL TELESCP MEGADYNE SMOKE EVAC 10FT

## (undated) DEVICE — PK KN TOTL 10

## (undated) DEVICE — NDL HYPO ECLPS SFTY 18G 1 1/2IN

## (undated) DEVICE — TRAP FLD MINIVAC MEGADYNE 100ML

## (undated) DEVICE — SYR LUERLOK 30CC

## (undated) DEVICE — SYS CLS SKIN PREMIERPRO EXOFINFUSION 22CM

## (undated) DEVICE — IMPLANTABLE DEVICE
Type: IMPLANTABLE DEVICE | Site: KNEE | Status: NON-FUNCTIONAL
Brand: PERSONA™
Removed: 2023-11-15

## (undated) DEVICE — PIN DRL NOHEAD TROC 3.2X75MM

## (undated) DEVICE — DRP ROBOTIC ROSA BX/20

## (undated) DEVICE — 3 BONE CEMENT MIXER: Brand: MIXEVAC

## (undated) DEVICE — BNDG ESMARK 6INX9FT STRL

## (undated) DEVICE — CABL BIPOL MEGADYNE 12FT DISP

## (undated) DEVICE — 450 ML BOTTLE OF 0.05% CHLORHEXIDINE GLUCONATE IN 99.95% STERILE WATER FOR IRRIGATION, USP AND APPLICATOR.: Brand: IRRISEPT ANTIMICROBIAL WOUND LAVAGE

## (undated) DEVICE — TB SXN FRAZIER 12F STRL

## (undated) DEVICE — BNDG ELAS W/CLIP 6IN 10YD LF STRL

## (undated) DEVICE — PATIENT RETURN ELECTRODE, SINGLE-USE, CONTACT QUALITY MONITORING, ADULT, WITH 9FT CORD, FOR PATIENTS WEIGING OVER 33LBS. (15KG): Brand: MEGADYNE

## (undated) DEVICE — STRAP POSTN KN/BDY FM 5X72IN DISP

## (undated) DEVICE — Device

## (undated) DEVICE — C-ARM DRAPE: Brand: DEROYAL

## (undated) DEVICE — UNDERCAST PADDING: Brand: DEROYAL

## (undated) DEVICE — PAD ARMBRD SURG CONVOL 7.5X20X2IN

## (undated) DEVICE — CONTAINER,SPECIMEN,OR STERILE,4OZ: Brand: MEDLINE

## (undated) DEVICE — DRSNG SURESITE WNDW 4X4.5

## (undated) DEVICE — STERILE PVP: Brand: MEDLINE INDUSTRIES, INC.

## (undated) DEVICE — SCRW HEX PERSONA FML 2.5X25MM PK/2
Type: IMPLANTABLE DEVICE | Site: KNEE | Status: NON-FUNCTIONAL
Removed: 2022-12-07

## (undated) DEVICE — SKN PREP SPNG STKS PVP PNT STR: Brand: MEDLINE INDUSTRIES, INC.

## (undated) DEVICE — INTENDED FOR TISSUE SEPARATION, AND OTHER PROCEDURES THAT REQUIRE A SHARP SURGICAL BLADE TO PUNCTURE OR CUT.: Brand: BARD-PARKER ® CARBON RIB-BACK BLADES

## (undated) DEVICE — DELFI MATCHING LIMB PROTECTION SLEEVES (MLPS) HELP PROTECT THE PATIENT’S LIMB FROM POSSIBLE WRINKLING, PINCHING AND SHEARING OF SKIN AND SOFT TISSUES OF THE LIMB.EACH DELFI MATCHING LIMB PROTECTION SLEEVE IS INTENDED FOR USE WITH A SPECIFIC DELFI TOURNIQUET CUFF. THIS SLEEVE IS SPECIFICALLY FOR THIGH.: Brand: MATCHING LIMB PROTECTION SLEEVES - VARI-FIT

## (undated) DEVICE — HDRST INTUB GENTLETOUCH SLOT 7IN RT

## (undated) DEVICE — 3M™ STERI-DRAPE™ INCISE DRAPE 1050 (60CM X 45CM): Brand: STERI-DRAPE™

## (undated) DEVICE — SUT VIC PLS CTD ANTIB BR 3/0 8/18IN 45CM

## (undated) DEVICE — GLV SURG PREMIERPRO MIC LTX PF SZ7.5 BRN

## (undated) DEVICE — ADHS SKIN PREMIERPRO EXOFIN TOPICAL HI/VISC .5ML

## (undated) DEVICE — KT PUMP INFUBLOCK MDL 2100 PMKITSOLIS

## (undated) DEVICE — DRSNG WND BORDR/ADHS NONADHR/GZ LF 4X4IN STRL